# Patient Record
Sex: MALE | Race: BLACK OR AFRICAN AMERICAN | NOT HISPANIC OR LATINO | URBAN - METROPOLITAN AREA
[De-identification: names, ages, dates, MRNs, and addresses within clinical notes are randomized per-mention and may not be internally consistent; named-entity substitution may affect disease eponyms.]

---

## 2019-04-20 ENCOUNTER — INPATIENT (INPATIENT)
Facility: HOSPITAL | Age: 59
LOS: 2 days | Discharge: HOME | DRG: 872 | End: 2019-04-23
Attending: INTERNAL MEDICINE | Admitting: INTERNAL MEDICINE
Payer: COMMERCIAL

## 2019-04-20 VITALS
DIASTOLIC BLOOD PRESSURE: 81 MMHG | HEART RATE: 105 BPM | OXYGEN SATURATION: 100 % | RESPIRATION RATE: 16 BRPM | TEMPERATURE: 100 F | HEIGHT: 70 IN | SYSTOLIC BLOOD PRESSURE: 141 MMHG | WEIGHT: 207.01 LBS

## 2019-04-20 DIAGNOSIS — N45.1 EPIDIDYMITIS: ICD-10-CM

## 2019-04-20 LAB
ACETONE SERPL-MCNC: NEGATIVE — SIGNIFICANT CHANGE UP
ALBUMIN SERPL ELPH-MCNC: 3.4 G/DL — LOW (ref 3.5–5)
ALP SERPL-CCNC: 80 U/L — SIGNIFICANT CHANGE UP (ref 40–120)
ALT FLD-CCNC: 19 U/L DA — SIGNIFICANT CHANGE UP (ref 10–60)
ANION GAP SERPL CALC-SCNC: 8 MMOL/L — SIGNIFICANT CHANGE UP (ref 5–17)
APPEARANCE UR: CLEAR — SIGNIFICANT CHANGE UP
APTT BLD: 31.9 SEC — SIGNIFICANT CHANGE UP (ref 27.5–36.3)
AST SERPL-CCNC: 16 U/L — SIGNIFICANT CHANGE UP (ref 10–40)
BASOPHILS # BLD AUTO: 0.03 K/UL — SIGNIFICANT CHANGE UP (ref 0–0.2)
BASOPHILS NFR BLD AUTO: 0.2 % — SIGNIFICANT CHANGE UP (ref 0–2)
BILIRUB SERPL-MCNC: 0.4 MG/DL — SIGNIFICANT CHANGE UP (ref 0.2–1.2)
BILIRUB UR-MCNC: NEGATIVE — SIGNIFICANT CHANGE UP
BUN SERPL-MCNC: 17 MG/DL — SIGNIFICANT CHANGE UP (ref 7–18)
CALCIUM SERPL-MCNC: 8.5 MG/DL — SIGNIFICANT CHANGE UP (ref 8.4–10.5)
CHLORIDE SERPL-SCNC: 104 MMOL/L — SIGNIFICANT CHANGE UP (ref 96–108)
CO2 SERPL-SCNC: 24 MMOL/L — SIGNIFICANT CHANGE UP (ref 22–31)
COLOR SPEC: YELLOW — SIGNIFICANT CHANGE UP
CREAT SERPL-MCNC: 1.41 MG/DL — HIGH (ref 0.5–1.3)
DIFF PNL FLD: ABNORMAL
EOSINOPHIL # BLD AUTO: 0.02 K/UL — SIGNIFICANT CHANGE UP (ref 0–0.5)
EOSINOPHIL NFR BLD AUTO: 0.1 % — SIGNIFICANT CHANGE UP (ref 0–6)
EPI CELLS # UR: SIGNIFICANT CHANGE UP /HPF
GLUCOSE SERPL-MCNC: 108 MG/DL — HIGH (ref 70–99)
GLUCOSE UR QL: NEGATIVE — SIGNIFICANT CHANGE UP
HCT VFR BLD CALC: 35.7 % — LOW (ref 39–50)
HGB BLD-MCNC: 11.5 G/DL — LOW (ref 13–17)
IMM GRANULOCYTES NFR BLD AUTO: 0.4 % — SIGNIFICANT CHANGE UP (ref 0–1.5)
INR BLD: 1.22 RATIO — HIGH (ref 0.88–1.16)
KETONES UR-MCNC: NEGATIVE — SIGNIFICANT CHANGE UP
LEUKOCYTE ESTERASE UR-ACNC: ABNORMAL
LYMPHOCYTES # BLD AUTO: 1.99 K/UL — SIGNIFICANT CHANGE UP (ref 1–3.3)
LYMPHOCYTES # BLD AUTO: 10.4 % — LOW (ref 13–44)
MAGNESIUM SERPL-MCNC: 2.2 MG/DL — SIGNIFICANT CHANGE UP (ref 1.6–2.6)
MCHC RBC-ENTMCNC: 28.7 PG — SIGNIFICANT CHANGE UP (ref 27–34)
MCHC RBC-ENTMCNC: 32.2 GM/DL — SIGNIFICANT CHANGE UP (ref 32–36)
MCV RBC AUTO: 89 FL — SIGNIFICANT CHANGE UP (ref 80–100)
MONOCYTES # BLD AUTO: 1.28 K/UL — HIGH (ref 0–0.9)
MONOCYTES NFR BLD AUTO: 6.7 % — SIGNIFICANT CHANGE UP (ref 2–14)
NEUTROPHILS # BLD AUTO: 15.76 K/UL — HIGH (ref 1.8–7.4)
NEUTROPHILS NFR BLD AUTO: 82.2 % — HIGH (ref 43–77)
NITRITE UR-MCNC: NEGATIVE — SIGNIFICANT CHANGE UP
NRBC # BLD: 0 /100 WBCS — SIGNIFICANT CHANGE UP (ref 0–0)
PH UR: 5 — SIGNIFICANT CHANGE UP (ref 5–8)
PLATELET # BLD AUTO: 415 K/UL — HIGH (ref 150–400)
POTASSIUM SERPL-MCNC: 3.9 MMOL/L — SIGNIFICANT CHANGE UP (ref 3.5–5.3)
POTASSIUM SERPL-SCNC: 3.9 MMOL/L — SIGNIFICANT CHANGE UP (ref 3.5–5.3)
PROT SERPL-MCNC: 8.2 G/DL — SIGNIFICANT CHANGE UP (ref 6–8.3)
PROT UR-MCNC: 30 MG/DL
PROTHROM AB SERPL-ACNC: 13.6 SEC — HIGH (ref 10–12.9)
RBC # BLD: 4.01 M/UL — LOW (ref 4.2–5.8)
RBC # FLD: 13.4 % — SIGNIFICANT CHANGE UP (ref 10.3–14.5)
RBC CASTS # UR COMP ASSIST: ABNORMAL /HPF (ref 0–2)
SODIUM SERPL-SCNC: 136 MMOL/L — SIGNIFICANT CHANGE UP (ref 135–145)
SP GR SPEC: 1.01 — SIGNIFICANT CHANGE UP (ref 1.01–1.02)
UROBILINOGEN FLD QL: NEGATIVE — SIGNIFICANT CHANGE UP
WBC # BLD: 19.15 K/UL — HIGH (ref 3.8–10.5)
WBC # FLD AUTO: 19.15 K/UL — HIGH (ref 3.8–10.5)
WBC UR QL: ABNORMAL /HPF (ref 0–5)

## 2019-04-20 PROCEDURE — 71045 X-RAY EXAM CHEST 1 VIEW: CPT | Mod: 26

## 2019-04-20 PROCEDURE — 99285 EMERGENCY DEPT VISIT HI MDM: CPT

## 2019-04-20 PROCEDURE — 76870 US EXAM SCROTUM: CPT | Mod: 26

## 2019-04-20 RX ORDER — PIPERACILLIN AND TAZOBACTAM 4; .5 G/20ML; G/20ML
3.38 INJECTION, POWDER, LYOPHILIZED, FOR SOLUTION INTRAVENOUS EVERY 8 HOURS
Qty: 0 | Refills: 0 | Status: DISCONTINUED | OUTPATIENT
Start: 2019-04-20 | End: 2019-04-21

## 2019-04-20 RX ORDER — HEPARIN SODIUM 5000 [USP'U]/ML
5000 INJECTION INTRAVENOUS; SUBCUTANEOUS EVERY 8 HOURS
Qty: 0 | Refills: 0 | Status: DISCONTINUED | OUTPATIENT
Start: 2019-04-20 | End: 2019-04-23

## 2019-04-20 RX ORDER — SODIUM CHLORIDE 9 MG/ML
1000 INJECTION INTRAMUSCULAR; INTRAVENOUS; SUBCUTANEOUS ONCE
Qty: 0 | Refills: 0 | Status: COMPLETED | OUTPATIENT
Start: 2019-04-20 | End: 2019-04-20

## 2019-04-20 RX ORDER — CEFTRIAXONE 500 MG/1
1 INJECTION, POWDER, FOR SOLUTION INTRAMUSCULAR; INTRAVENOUS ONCE
Qty: 0 | Refills: 0 | Status: COMPLETED | OUTPATIENT
Start: 2019-04-20 | End: 2019-04-20

## 2019-04-20 RX ORDER — ACETAMINOPHEN 500 MG
650 TABLET ORAL ONCE
Qty: 0 | Refills: 0 | Status: COMPLETED | OUTPATIENT
Start: 2019-04-20 | End: 2019-04-20

## 2019-04-20 RX ORDER — SODIUM CHLORIDE 9 MG/ML
2900 INJECTION INTRAMUSCULAR; INTRAVENOUS; SUBCUTANEOUS ONCE
Qty: 0 | Refills: 0 | Status: COMPLETED | OUTPATIENT
Start: 2019-04-20 | End: 2019-04-20

## 2019-04-20 RX ORDER — DIPHENHYDRAMINE HCL 50 MG
25 CAPSULE ORAL ONCE
Qty: 0 | Refills: 0 | Status: COMPLETED | OUTPATIENT
Start: 2019-04-20 | End: 2019-04-20

## 2019-04-20 RX ORDER — KETOROLAC TROMETHAMINE 30 MG/ML
30 SYRINGE (ML) INJECTION ONCE
Qty: 0 | Refills: 0 | Status: DISCONTINUED | OUTPATIENT
Start: 2019-04-20 | End: 2019-04-20

## 2019-04-20 RX ADMIN — Medication 650 MILLIGRAM(S): at 15:10

## 2019-04-20 RX ADMIN — SODIUM CHLORIDE 1500 MILLILITER(S): 9 INJECTION INTRAMUSCULAR; INTRAVENOUS; SUBCUTANEOUS at 22:11

## 2019-04-20 RX ADMIN — Medication 30 MILLIGRAM(S): at 15:11

## 2019-04-20 RX ADMIN — CEFTRIAXONE 100 GRAM(S): 500 INJECTION, POWDER, FOR SOLUTION INTRAMUSCULAR; INTRAVENOUS at 14:38

## 2019-04-20 RX ADMIN — Medication 650 MILLIGRAM(S): at 19:29

## 2019-04-20 RX ADMIN — PIPERACILLIN AND TAZOBACTAM 25 GRAM(S): 4; .5 INJECTION, POWDER, LYOPHILIZED, FOR SOLUTION INTRAVENOUS at 23:21

## 2019-04-20 RX ADMIN — Medication 25 MILLIGRAM(S): at 23:30

## 2019-04-20 RX ADMIN — SODIUM CHLORIDE 2900 MILLILITER(S): 9 INJECTION INTRAMUSCULAR; INTRAVENOUS; SUBCUTANEOUS at 14:00

## 2019-04-20 RX ADMIN — Medication 650 MILLIGRAM(S): at 14:40

## 2019-04-20 RX ADMIN — SODIUM CHLORIDE 2900 MILLILITER(S): 9 INJECTION INTRAMUSCULAR; INTRAVENOUS; SUBCUTANEOUS at 15:30

## 2019-04-20 RX ADMIN — Medication 30 MILLIGRAM(S): at 14:41

## 2019-04-20 RX ADMIN — CEFTRIAXONE 1 GRAM(S): 500 INJECTION, POWDER, FOR SOLUTION INTRAMUSCULAR; INTRAVENOUS at 15:08

## 2019-04-20 RX ADMIN — Medication 650 MILLIGRAM(S): at 18:21

## 2019-04-20 NOTE — H&P ADULT - PROBLEM SELECTOR PLAN 1
Patient p/w L testicular pain, erythema and tenderness x Friday in addition to hematuria and dysuria.  US testicles: Left epididymitis and small varicocele.  Urology consult: Dr Roland  c/w Kelly  Follow up UCx, BCx Patient p/w L testicular pain, erythema and tenderness x Friday in addition to hematuria and dysuria.  US testicles: Left epididymitis and small varicocele.  Urology consult: Dr Roland, ID: Dr Payne  c/w Zosyn  Follow up UCx, BCx Patient p/w left testicular pain, erythema and tenderness x Friday in addition to hematuria and dysuria.  US testicles: Left epididymitis and small varicocele.  Urology consult: Dr Roland, ID: Dr Payne  c/w IV Zosyn  Follow up UCx, BCx

## 2019-04-20 NOTE — ED PROVIDER NOTE - OBJECTIVE STATEMENT
59 y.o. male c/o Lt testicular pain, swelling since Fri., fever past 3 days, chills, dysuria, hematuria, night sweat Wed. & Thurs., no n/v, recent travelling, pt took nothing, no h/o STD 59 y.o. male c/o Lt testicular pain, swelling since Fri., fever past 3 days, chills, rigors, dysuria, hematuria, night sweat Wed. & Thurs., no n/v, recent travelling, pt took nothing, no h/o STD

## 2019-04-20 NOTE — ED PROVIDER NOTE - CARE PLAN
Principal Discharge DX:	Epididymitis with no abscess  Secondary Diagnosis:	Cystitis  Secondary Diagnosis:	Renal insufficiency  Secondary Diagnosis:	Anemia

## 2019-04-20 NOTE — H&P ADULT - ASSESSMENT
59M, w/ PMHx HTN p/w L testicular pain and swelling x Friday, in addition to fever/chills, dysuria and hematuria.     ED: vitals stable, afebrile. WBC 19.15 w/ left shift, UA++, Testicular US: Left epididymitis and small varicocele.- Admitted for further management 59M, w/ PMHx HTN p/w left testicular pain and swelling x Friday, in addition to fever/chills, dysuria and hematuria.     ED: vitals stable, afebrile. WBC 19.15 w/ left shift, UA++, Testicular US: Left epididymitis and small varicocele.- Admitted for further management

## 2019-04-20 NOTE — H&P ADULT - NSHPSOCIALHISTORY_GEN_ALL_CORE
no tobacco smoking, no alcohol consumption no tobacco smoking, no alcohol consumption, no recreational drug use

## 2019-04-20 NOTE — H&P ADULT - PROBLEM SELECTOR PLAN 2
Likely pre-renal  s/p 3.9L NS , follow up BMP in AM after fluid challenge  Avoid nephrotoxic drugs  Follow up urine lytes

## 2019-04-20 NOTE — H&P ADULT - ATTENDING COMMENTS
Patient seen and examined in ED. Patient's history, vitals, labs, imaging studies reviewed.  Plan of care discussed with patient, and agrees, all questions answered.   Meagan Spivey MD Patient seen and examined in ED. Patient's history, vitals, labs, imaging studies reviewed.  Plan of care discussed with patient, and wife at bedside, agrees, all questions answered.   Meagan Spivey MD  4/20/2019 Patient seen and examined in ED. Patient's history, vitals, labs, imaging studies reviewed.  Discussed with above resident, agree with note with edits, and she was educated on the diagnosis and management of above medical conditions. In addition, severe sepsis present on admission - continue IV fluids, IV antibiotics, monitor wbc, temp, vitals. Plan of care discussed with patient, and wife at bedside, agrees, all questions answered.   Meagan Spivey MD  4/20/2019

## 2019-04-20 NOTE — ED ADULT NURSE NOTE - OBJECTIVE STATEMENT
presented tot he ER c/o pain to Left testicle  1 week , began to have fevers , chills , hematuria , painful urination  x 3 days , increased pain and swelling to Left testicle

## 2019-04-20 NOTE — H&P ADULT - HISTORY OF PRESENT ILLNESS
59M, w/ PMHx HTN p/w L testicular pain and swelling x Friday, in addition to fever/chills, dysuria and hematuria. Denies N/V, abdominal pain, diarrhea, urethral discharge or previous similar symptoms. Patient sexually active w/ wife, denies Hx STD's. 59M, w/ PMHx HTN p/w L testicular pain and swelling x Friday, in addition to fever/chills, dysuria and hematuria. Denies N/V, abdominal pain, diarrhea, flank pain, urethral discharge or previous similar symptoms. Patient sexually active w/ wife, denies Hx STD's. 59M, w/ PMHx HTN p/w left testicular pain and swelling x Friday, in addition to fever/chills, dysuria and hematuria. Denies N/V, abdominal pain, diarrhea, flank pain, urethral discharge or previous similar symptoms. Patient sexually active w/ wife, denies Hx STD's.

## 2019-04-21 DIAGNOSIS — A41.9 SEPSIS, UNSPECIFIED ORGANISM: ICD-10-CM

## 2019-04-21 DIAGNOSIS — N17.9 ACUTE KIDNEY FAILURE, UNSPECIFIED: ICD-10-CM

## 2019-04-21 DIAGNOSIS — Z29.9 ENCOUNTER FOR PROPHYLACTIC MEASURES, UNSPECIFIED: ICD-10-CM

## 2019-04-21 DIAGNOSIS — N45.1 EPIDIDYMITIS: ICD-10-CM

## 2019-04-21 DIAGNOSIS — I10 ESSENTIAL (PRIMARY) HYPERTENSION: ICD-10-CM

## 2019-04-21 DIAGNOSIS — E53.8 DEFICIENCY OF OTHER SPECIFIED B GROUP VITAMINS: ICD-10-CM

## 2019-04-21 LAB
ANION GAP SERPL CALC-SCNC: 6 MMOL/L — SIGNIFICANT CHANGE UP (ref 5–17)
BASOPHILS # BLD AUTO: 0.04 K/UL — SIGNIFICANT CHANGE UP (ref 0–0.2)
BASOPHILS NFR BLD AUTO: 0.2 % — SIGNIFICANT CHANGE UP (ref 0–2)
BUN SERPL-MCNC: 16 MG/DL — SIGNIFICANT CHANGE UP (ref 7–18)
CALCIUM SERPL-MCNC: 7.7 MG/DL — LOW (ref 8.4–10.5)
CHLORIDE SERPL-SCNC: 107 MMOL/L — SIGNIFICANT CHANGE UP (ref 96–108)
CHOLEST SERPL-MCNC: 130 MG/DL — SIGNIFICANT CHANGE UP (ref 10–199)
CO2 SERPL-SCNC: 24 MMOL/L — SIGNIFICANT CHANGE UP (ref 22–31)
CREAT SERPL-MCNC: 1.38 MG/DL — HIGH (ref 0.5–1.3)
CULTURE RESULTS: NO GROWTH — SIGNIFICANT CHANGE UP
EOSINOPHIL # BLD AUTO: 0.03 K/UL — SIGNIFICANT CHANGE UP (ref 0–0.5)
EOSINOPHIL NFR BLD AUTO: 0.1 % — SIGNIFICANT CHANGE UP (ref 0–6)
FERRITIN SERPL-MCNC: 143 NG/ML — SIGNIFICANT CHANGE UP (ref 30–400)
GLUCOSE SERPL-MCNC: 100 MG/DL — HIGH (ref 70–99)
HBA1C BLD-MCNC: 6.2 % — HIGH (ref 4–5.6)
HCT VFR BLD CALC: 32.3 % — LOW (ref 39–50)
HCV AB S/CO SERPL IA: 0.06 S/CO — SIGNIFICANT CHANGE UP (ref 0–0.99)
HCV AB SERPL-IMP: SIGNIFICANT CHANGE UP
HDLC SERPL-MCNC: 47 MG/DL — SIGNIFICANT CHANGE UP
HGB BLD-MCNC: 10.4 G/DL — LOW (ref 13–17)
IMM GRANULOCYTES NFR BLD AUTO: 1.2 % — SIGNIFICANT CHANGE UP (ref 0–1.5)
IRON SATN MFR SERPL: 10 UG/DL — LOW (ref 65–170)
IRON SATN MFR SERPL: 6 % — LOW (ref 20–55)
LIPID PNL WITH DIRECT LDL SERPL: 65 MG/DL — SIGNIFICANT CHANGE UP
LYMPHOCYTES # BLD AUTO: 13.8 % — SIGNIFICANT CHANGE UP (ref 13–44)
LYMPHOCYTES # BLD AUTO: 3.34 K/UL — HIGH (ref 1–3.3)
MAGNESIUM SERPL-MCNC: 2 MG/DL — SIGNIFICANT CHANGE UP (ref 1.6–2.6)
MCHC RBC-ENTMCNC: 28.8 PG — SIGNIFICANT CHANGE UP (ref 27–34)
MCHC RBC-ENTMCNC: 32.2 GM/DL — SIGNIFICANT CHANGE UP (ref 32–36)
MCV RBC AUTO: 89.5 FL — SIGNIFICANT CHANGE UP (ref 80–100)
MONOCYTES # BLD AUTO: 2.21 K/UL — HIGH (ref 0–0.9)
MONOCYTES NFR BLD AUTO: 9.1 % — SIGNIFICANT CHANGE UP (ref 2–14)
NEUTROPHILS # BLD AUTO: 18.31 K/UL — HIGH (ref 1.8–7.4)
NEUTROPHILS NFR BLD AUTO: 75.6 % — SIGNIFICANT CHANGE UP (ref 43–77)
NRBC # BLD: 0 /100 WBCS — SIGNIFICANT CHANGE UP (ref 0–0)
OSMOLALITY UR: 531 MOS/KG — SIGNIFICANT CHANGE UP (ref 50–1200)
PHOSPHATE SERPL-MCNC: 2.1 MG/DL — LOW (ref 2.5–4.5)
PLATELET # BLD AUTO: 392 K/UL — SIGNIFICANT CHANGE UP (ref 150–400)
POTASSIUM SERPL-MCNC: 3.7 MMOL/L — SIGNIFICANT CHANGE UP (ref 3.5–5.3)
POTASSIUM SERPL-SCNC: 3.7 MMOL/L — SIGNIFICANT CHANGE UP (ref 3.5–5.3)
POTASSIUM UR-SCNC: 27 MMOL/L — SIGNIFICANT CHANGE UP (ref 25–125)
RBC # BLD: 3.61 M/UL — LOW (ref 4.2–5.8)
RBC # FLD: 13.3 % — SIGNIFICANT CHANGE UP (ref 10.3–14.5)
SODIUM SERPL-SCNC: 137 MMOL/L — SIGNIFICANT CHANGE UP (ref 135–145)
SODIUM UR-SCNC: 105 MMOL/L — SIGNIFICANT CHANGE UP (ref 40–220)
SPECIMEN SOURCE: SIGNIFICANT CHANGE UP
TIBC SERPL-MCNC: 176 UG/DL — LOW (ref 250–450)
TOTAL CHOLESTEROL/HDL RATIO MEASUREMENT: 2.8 RATIO — LOW (ref 3.4–9.6)
TRIGL SERPL-MCNC: 92 MG/DL — SIGNIFICANT CHANGE UP (ref 10–149)
TSH SERPL-MCNC: 0.8 UU/ML — SIGNIFICANT CHANGE UP (ref 0.34–4.82)
UIBC SERPL-MCNC: 166 UG/DL — SIGNIFICANT CHANGE UP (ref 110–370)
VIT B12 SERPL-MCNC: 222 PG/ML — LOW (ref 232–1245)
WBC # BLD: 24.22 K/UL — HIGH (ref 3.8–10.5)
WBC # FLD AUTO: 24.22 K/UL — HIGH (ref 3.8–10.5)

## 2019-04-21 RX ORDER — ACETAMINOPHEN 500 MG
650 TABLET ORAL EVERY 6 HOURS
Qty: 0 | Refills: 0 | Status: DISCONTINUED | OUTPATIENT
Start: 2019-04-21 | End: 2019-04-23

## 2019-04-21 RX ORDER — LANOLIN ALCOHOL/MO/W.PET/CERES
5 CREAM (GRAM) TOPICAL AT BEDTIME
Qty: 0 | Refills: 0 | Status: DISCONTINUED | OUTPATIENT
Start: 2019-04-21 | End: 2019-04-23

## 2019-04-21 RX ORDER — SODIUM CHLORIDE 9 MG/ML
1000 INJECTION INTRAMUSCULAR; INTRAVENOUS; SUBCUTANEOUS
Qty: 0 | Refills: 0 | Status: DISCONTINUED | OUTPATIENT
Start: 2019-04-21 | End: 2019-04-21

## 2019-04-21 RX ORDER — PIPERACILLIN AND TAZOBACTAM 4; .5 G/20ML; G/20ML
3.38 INJECTION, POWDER, LYOPHILIZED, FOR SOLUTION INTRAVENOUS EVERY 8 HOURS
Qty: 0 | Refills: 0 | Status: DISCONTINUED | OUTPATIENT
Start: 2019-04-21 | End: 2019-04-23

## 2019-04-21 RX ORDER — IBUPROFEN 200 MG
400 TABLET ORAL EVERY 8 HOURS
Qty: 0 | Refills: 0 | Status: DISCONTINUED | OUTPATIENT
Start: 2019-04-21 | End: 2019-04-23

## 2019-04-21 RX ORDER — PREGABALIN 225 MG/1
1000 CAPSULE ORAL DAILY
Qty: 0 | Refills: 0 | Status: DISCONTINUED | OUTPATIENT
Start: 2019-04-21 | End: 2019-04-21

## 2019-04-21 RX ORDER — OLMESARTAN MEDOXOMIL / AMLODIPINE BESYLATE / HYDROCHLOROTHIAZIDE 40; 10; 25 MG/1; MG/1; MG/1
1 TABLET, FILM COATED ORAL
Qty: 0 | Refills: 0 | COMMUNITY

## 2019-04-21 RX ORDER — SODIUM CHLORIDE 9 MG/ML
1000 INJECTION INTRAMUSCULAR; INTRAVENOUS; SUBCUTANEOUS
Qty: 0 | Refills: 0 | Status: DISCONTINUED | OUTPATIENT
Start: 2019-04-21 | End: 2019-04-22

## 2019-04-21 RX ORDER — PREGABALIN 225 MG/1
1000 CAPSULE ORAL DAILY
Qty: 0 | Refills: 0 | Status: DISCONTINUED | OUTPATIENT
Start: 2019-04-21 | End: 2019-04-22

## 2019-04-21 RX ADMIN — PREGABALIN 1000 MICROGRAM(S): 225 CAPSULE ORAL at 22:46

## 2019-04-21 RX ADMIN — SODIUM CHLORIDE 100 MILLILITER(S): 9 INJECTION INTRAMUSCULAR; INTRAVENOUS; SUBCUTANEOUS at 21:53

## 2019-04-21 RX ADMIN — Medication 5 MILLIGRAM(S): at 22:46

## 2019-04-21 RX ADMIN — Medication 400 MILLIGRAM(S): at 19:00

## 2019-04-21 RX ADMIN — SODIUM CHLORIDE 75 MILLILITER(S): 9 INJECTION INTRAMUSCULAR; INTRAVENOUS; SUBCUTANEOUS at 07:41

## 2019-04-21 RX ADMIN — Medication 400 MILLIGRAM(S): at 18:16

## 2019-04-21 RX ADMIN — PIPERACILLIN AND TAZOBACTAM 25 GRAM(S): 4; .5 INJECTION, POWDER, LYOPHILIZED, FOR SOLUTION INTRAVENOUS at 14:38

## 2019-04-21 RX ADMIN — PIPERACILLIN AND TAZOBACTAM 25 GRAM(S): 4; .5 INJECTION, POWDER, LYOPHILIZED, FOR SOLUTION INTRAVENOUS at 21:46

## 2019-04-21 NOTE — PROGRESS NOTE ADULT - PROBLEM SELECTOR PLAN 2
present on admission, continue IV antibiotics, IV fluids present on admission, continue IV antibiotics, IV fluids  monitor vitals, labs  ID Dr. Payne

## 2019-04-21 NOTE — PROGRESS NOTE ADULT - PROBLEM SELECTOR PLAN 3
Likely pre-renal, continue IV fluids  follow up BMP in AM after fluid challenge  Avoid nephrotoxic drugs  Follow up urine lytes

## 2019-04-21 NOTE — CONSULT NOTE ADULT - ASSESSMENT
60 yo M with left epidydimitis    - Reviewed labwork and imaging  - Continue care per primary team and ID  - FU all cultures  - No urgent surgical intervention indicated at this time
Left sided Epididymitis   Leukocytosis  Fevers    Plan - cont Zosyn 3.375gms iv q8hrs  await culture results.  Ordered Scrotal support for patient.

## 2019-04-21 NOTE — PROGRESS NOTE ADULT - SUBJECTIVE AND OBJECTIVE BOX
Patient is a 59 year old  Male who presents with a chief complaint of epididymitis (2019 13:16)    Patient seen and examined, reports fever    MEDICATIONS  (STANDING):  cyanocobalamin Injectable 1000 MICROGram(s) IntraMuscular daily  heparin  Injectable 5000 Unit(s) SubCutaneous every 8 hours  piperacillin/tazobactam IVPB. 3.375 Gram(s) IV Intermittent every 8 hours  sodium chloride 0.9%. 1000 milliLiter(s) (100 mL/Hr) IV Continuous <Continuous>    MEDICATIONS  (PRN):  acetaminophen   Tablet .. 650 milliGRAM(s) Oral every 6 hours PRN Temp greater or equal to 38C (100.4F)  ibuprofen  Tablet. 400 milliGRAM(s) Oral every 8 hours PRN Temp greater or equal to 38.5C (101.3F), Moderate Pain (4 - 6)      REVIEW OF SYSTEMS:  CONSTITUTIONAL: fever, weight loss, has fatigue  EYES: No eye pain, visual disturbances, or discharge  ENMT:  No difficulty hearing, tinnitus, vertigo; No sinus or throat pain  NECK: No pain or stiffness  RESPIRATORY: No cough, wheezing, chills or hemoptysis; No shortness of breath  CARDIOVASCULAR: No chest pain, palpitations, dizziness, or leg swelling  GASTROINTESTINAL: No abdominal or epigastric pain. No nausea, vomiting, or hematemesis; No diarrhea or constipation. No melena or hematochezia.  GENITOURINARY: No dysuria, frequency, hematuria, or incontinence  NEUROLOGICAL: No headaches, memory loss, loss of strength, numbness, or tremors  SKIN: No itching, burning, rashes, or lesions   ENDOCRINE: No heat or cold intolerance; No hair loss  MUSCULOSKELETAL: No joint pain or swelling; No muscle, back, or extremity pain  PSYCHIATRIC: No depression, anxiety, mood swings, or difficulty sleeping  HEME/LYMPH: No easy bruising, or bleeding gums  ALLERGY AND IMMUNOLOGIC: No hives or eczema    PHYSICAL EXAM:  T(C): 38.3 (19 @ 18:10), Max: 38.3 (19 @ 18:10)  HR: 93 (19 @ 15:33) (82 - 93)  BP: 123/65 (19 @ 15:33) (98/58 - 129/70)  RR: 16 (19 @ 15:33) (16 - 18)  SpO2: 98% (19 @ 15:33) (96% - 100%)        GENERAL: NAD, well-groomed, well-developed  HEAD:  Atraumatic, Normocephalic  EYES: EOMI, PERRL, conjunctiva and sclera clear  ENMT:  Moist mucous membranes, Good dentition, No lesions  NECK: Supple, No JVD, Normal thyroid  NERVOUS SYSTEM:  Alert & Oriented X3, no focal deficit  CHEST/LUNG: Clear to ascultation bilaterally; No rales, rhonchi, wheezing, or rubs  HEART: Regular rate and rhythm; No murmurs, rubs, or gallops  ABDOMEN: Soft, Nontender, Nondistended; Bowel sounds present  EXTREMITIES:  2+ Peripheral Pulses, No clubbing, cyanosis, or edema  SKIN: left testicular edema, erythema        LABS:                        10.4   24.22 )-----------( 392      ( 2019 05:55 )             32.3         137  |  107  |  16  ----------------------------<  100<H>  3.7   |  24  |  1.38<H>    Ca    7.7<L>      2019 05:55  Phos  2.1       Mg     2.0         TPro  8.2  /  Alb  3.4<L>  /  TBili  0.4  /  DBili  x   /  AST  16  /  ALT  19  /  AlkPhos  80  -    PT/INR - ( 2019 14:16 )   PT: 13.6 sec;   INR: 1.22 ratio         PTT - ( 2019 14:16 )  PTT:31.9 sec  Urinalysis Basic - ( 2019 14:16 )    Color: Yellow / Appearance: Clear / S.015 / pH: x  Gluc: x / Ketone: Negative  / Bili: Negative / Urobili: Negative   Blood: x / Protein: 30 mg/dL / Nitrite: Negative   Leuk Esterase: Moderate / RBC: 25-50 /HPF / WBC 11-25 /HPF   Sq Epi: x / Non Sq Epi: Few /HPF / Bacteria: x      Culture - Urine (collected 19 @ 23:26)  Source: .Urine  Final Report (19 @ 17:33):    No growth        RADIOLOGY & ADDITIONAL TESTS:    Consultant(s) Notes Reviewed:  [x] YES  [ ] NO    Care Discussed with Consultants/Other Providers [x] YES  [ ] NO Patient is a 59 year old  Male who presents with a chief complaint of epididymitis (2019 13:16)    Patient seen and examined, reports fever, (Tm 101F) and chills,     MEDICATIONS  (STANDING):  cyanocobalamin Injectable 1000 MICROGram(s) IntraMuscular daily  heparin  Injectable 5000 Unit(s) SubCutaneous every 8 hours  piperacillin/tazobactam IVPB. 3.375 Gram(s) IV Intermittent every 8 hours  sodium chloride 0.9%. 1000 milliLiter(s) (100 mL/Hr) IV Continuous <Continuous>    MEDICATIONS  (PRN):  acetaminophen   Tablet .. 650 milliGRAM(s) Oral every 6 hours PRN Temp greater or equal to 38C (100.4F)  ibuprofen  Tablet. 400 milliGRAM(s) Oral every 8 hours PRN Temp greater or equal to 38.5C (101.3F), Moderate Pain (4 - 6)      REVIEW OF SYSTEMS:  CONSTITUTIONAL: has fever, chills, fatigue  EYES: No eye pain, visual disturbances, or discharge  ENMT:  No difficulty hearing, tinnitus, vertigo; No sinus or throat pain  NECK: No pain or stiffness  RESPIRATORY: No cough, wheezing, chills or hemoptysis; No shortness of breath  CARDIOVASCULAR: No chest pain, palpitations, dizziness, or leg swelling  GASTROINTESTINAL: No abdominal or epigastric pain. No nausea, vomiting, or hematemesis; No diarrhea or constipation. No melena or hematochezia.  GENITOURINARY: No dysuria, frequency, hematuria, or incontinence  NEUROLOGICAL: No headaches, memory loss, loss of strength, numbness, or tremors  SKIN: No itching, burning, rashes, or lesions   ENDOCRINE: No heat or cold intolerance; No hair loss  MUSCULOSKELETAL: No joint pain or swelling; No muscle, back, or extremity pain  PSYCHIATRIC: No depression, anxiety, mood swings, or difficulty sleeping  HEME/LYMPH: No easy bruising, or bleeding gums  ALLERGY AND IMMUNOLOGIC: No hives or eczema    PHYSICAL EXAM:  T(C): 38.3 (19 @ 18:10), Max: 38.3 (19 @ 18:10)  HR: 93 (19 @ 15:33) (82 - 93)  BP: 123/65 (19 @ 15:33) (98/58 - 129/70)  RR: 16 (19 @ 15:33) (16 - 18)  SpO2: 98% (19 @ 15:33) (96% - 100%)        GENERAL: NAD, well-groomed, well-developed  HEAD:  Atraumatic, Normocephalic  EYES: EOMI, PERRL, conjunctiva and sclera clear  ENMT:  Moist mucous membranes, Good dentition, No lesions  NECK: Supple, No JVD, Normal thyroid  NERVOUS SYSTEM:  Alert & Oriented X3, no focal deficit  CHEST/LUNG: Clear to ascultation bilaterally; No rales, rhonchi, wheezing, or rubs  HEART: Regular rate and rhythm; No murmurs, rubs, or gallops  ABDOMEN: Soft, Nontender, Nondistended; Bowel sounds present  EXTREMITIES:  2+ Peripheral Pulses, No clubbing, cyanosis, or edema  SKIN: left testicular edema, erythema        LABS:                        10.4   24.22 )-----------( 392      ( 2019 05:55 )             32.3         137  |  107  |  16  ----------------------------<  100<H>  3.7   |  24  |  1.38<H>    Ca    7.7<L>      2019 05:55  Phos  2.1       Mg     2.0         TPro  8.2  /  Alb  3.4<L>  /  TBili  0.4  /  DBili  x   /  AST  16  /  ALT  19  /  AlkPhos  80      PT/INR - ( 2019 14:16 )   PT: 13.6 sec;   INR: 1.22 ratio         PTT - ( 2019 14:16 )  PTT:31.9 sec  Urinalysis Basic - ( 2019 14:16 )    Color: Yellow / Appearance: Clear / S.015 / pH: x  Gluc: x / Ketone: Negative  / Bili: Negative / Urobili: Negative   Blood: x / Protein: 30 mg/dL / Nitrite: Negative   Leuk Esterase: Moderate / RBC: 25-50 /HPF / WBC 11-25 /HPF   Sq Epi: x / Non Sq Epi: Few /HPF / Bacteria: x      Culture - Urine (collected 19 @ 23:26)  Source: .Urine  Final Report (19 @ 17:33):    No growth        RADIOLOGY & ADDITIONAL TESTS:    Consultant(s) Notes Reviewed:  [x] YES  [ ] NO    Care Discussed with Consultants/Other Providers [x] YES  [ ] NO

## 2019-04-21 NOTE — CONSULT NOTE ADULT - GASTROINTESTINAL DETAILS
no guarding/no rigidity/bowel sounds normal/no organomegaly/no distention/nontender/soft/no masses palpable

## 2019-04-21 NOTE — CHART NOTE - NSCHARTNOTEFT_GEN_A_CORE
EVENT: Called to bedside pt refusing IM B12 and requesting med to sleep    OBJECTIVE:  Vital Signs Last 24 Hrs  T(C): 38.3 (2019 18:10), Max: 38.3 (2019 18:10)  T(F): 101 (2019 18:10), Max: 101 (2019 18:10)  HR: 93 (2019 15:33) (83 - 93)  BP: 123/65 (2019 15:33) (98/58 - 129/70)  BP(mean): --  RR: 16 (2019 15:33) (16 - 18)  SpO2: 98% (2019 15:33) (98% - 100%)    FOCUSED PHYSICAL EXAM:  Neuro: Alert, oriented X 3  Resp: even, unlabored, symmetry at chest wall  Cardiac: S1 S2 regular rate  skin: warm,  dry  Ext: AROM    LABS:                        10.4   24.22 )-----------( 392      ( 2019 05:55 )             32.3     04-21    137  |  107  |  16  ----------------------------<  100<H>  3.7   |  24  |  1.38<H>    Ca    7.7<L>      2019 05:55  Phos  2.1     -  Mg     2.0     -    TPro  8.2  /  Alb  3.4<L>  /  TBili  0.4  /  DBili  x   /  AST  16  /  ALT  19  /  AlkPhos  80  -      EK19  Ventricular Rate 86 BPM  Atrial Rate 86 BPM  P-R Interval 166 ms  QRS Duration 84 ms  Q-T Interval 350 ms  QTC Calculation(Bezet) 418 ms  P Axis 57 degrees  R Axis -8 degrees  T Axis 0 degrees  Diagnosis Line Normal sinus rhythm  Inferior infarct , age undetermined  Abnormal ECG    IMAGING: CXR 19  COMPARISON: None available.  Left hemidiaphragm is mildly elevated.  The heart does not suggest enlargement.  The lung fields and pleural surfaces are unremarkable.  IMPRESSION: Slightly elevated left hemidiaphragm. Otherwise unremarkable   study.    ASSESSMENT:  HPI:  59M, w/ PMHx HTN p/w left testicular pain and swelling x Friday, in addition to fever/chills, dysuria and hematuria. Denies N/V, abdominal pain, diarrhea, flank pain, urethral discharge or previous similar symptoms. Patient sexually active w/ wife, denies Hx STD's. (2019 19:52)    PLAN:   1. Switch to B12 1000mcg PO   2. Melatonin 5 mg po qhs    FOLLOW UP: B12 level

## 2019-04-21 NOTE — CONSULT NOTE ADULT - GENITOURINARY COMMENTS
left scrotal swelling and pain Left scrotal swelling, with warmth and tenderness of skin and tenderness and swelling of epididymus and some mild testicular enlargement and swelling but no tenderness of testicle, no penile lesions or abnormality

## 2019-04-21 NOTE — CHART NOTE - NSCHARTNOTEFT_GEN_A_CORE
Pt noted to have a fever of 101, no additional blood cultures sent as cultures sent yesterday. Started PRN Tylenol for fever. CXR performed yesterday unremarkable, UA and UCx negative. WBC this AM significant for WBC Count : 24.22 K/uL.    Vital Signs Last 24 Hrs  T(C): 38.3 (21 Apr 2019 18:10), Max: 38.3 (21 Apr 2019 18:10)  T(F): 101 (21 Apr 2019 18:10), Max: 101 (21 Apr 2019 18:10)  HR: 93 (21 Apr 2019 15:33) (82 - 93)  BP: 123/65 (21 Apr 2019 15:33) (98/58 - 129/70)  BP(mean): --  RR: 16 (21 Apr 2019 15:33) (16 - 18)  SpO2: 98% (21 Apr 2019 15:33) (96% - 100%)    CBC Full  -  ( 21 Apr 2019 05:55 )  WBC Count : 24.22 K/uL  RBC Count : 3.61 M/uL  Hemoglobin : 10.4 g/dL  Hematocrit : 32.3 %  Platelet Count - Automated : 392 K/uL  Mean Cell Volume : 89.5 fl  Mean Cell Hemoglobin : 28.8 pg  Mean Cell Hemoglobin Concentration : 32.2 gm/dL  Auto Neutrophil # : 18.31 K/uL  Auto Lymphocyte # : 3.34 K/uL  Auto Monocyte # : 2.21 K/uL  Auto Eosinophil # : 0.03 K/uL  Auto Basophil # : 0.04 K/uL  Auto Neutrophil % : 75.6 %  Auto Lymphocyte % : 13.8 %  Auto Monocyte % : 9.1 %  Auto Eosinophil % : 0.1 %  Auto Basophil % : 0.2 %

## 2019-04-21 NOTE — CONSULT NOTE ADULT - SUBJECTIVE AND OBJECTIVE BOX
59M, w/ PMHx HTN p/w left testicular pain and swelling x Friday, in addition to fever/chills, dysuria and hematuria. Denies N/V, abdominal pain, diarrhea, flank pain, urethral discharge or previous similar symptoms. Patient sexually active w/ wife, denies Hx STD's.    Currently continues to have some testicular pain but no more fever or chills. Feeling somewhat better.    PAST MEDICAL & SURGICAL HISTORY:  HTN (hypertension)  No significant past surgical history    FAMILY HISTORY:  No pertinent family history in first degree relatives    Allergies    No Known Allergies    MEDICATIONS  (STANDING):  heparin  Injectable 5000 Unit(s) SubCutaneous every 8 hours  piperacillin/tazobactam IVPB. 3.375 Gram(s) IV Intermittent every 8 hours  sodium chloride 0.9%. 1000 milliLiter(s) (75 mL/Hr) IV Continuous <Continuous>    Social: neg tob, etoh, illicit    REVIEW OF SYSTEMS: 12 pt ROS neg except for HPI    Vital Signs Last 24 Hrs  T(C): 36.8 (21 Apr 2019 08:30), Max: 37.9 (20 Apr 2019 13:02)  T(F): 98.3 (21 Apr 2019 08:30), Max: 100.3 (20 Apr 2019 13:02)  HR: 89 (21 Apr 2019 08:30) (82 - 105)  BP: 109/55 (21 Apr 2019 08:30) (98/58 - 141/81)  BP(mean): --  RR: 18 (21 Apr 2019 08:30) (16 - 18)  SpO2: 98% (21 Apr 2019 08:30) (96% - 100%)    Abd: soft, nt/nd  : normal right testicle, left testicle difficult to palpate due to edema and scrotal swelling  Ext: neg c/c/e                          10.4   24.22 )-----------( 392      ( 21 Apr 2019 05:55 )             32.3   04-21    137  |  107  |  16  ----------------------------<  100<H>  3.7   |  24  |  1.38<H>    Ca    7.7<L>      21 Apr 2019 05:55  Phos  2.1     04-21  Mg     2.0     04-21    TPro  8.2  /  Alb  3.4<L>  /  TBili  0.4  /  DBili  x   /  AST  16  /  ALT  19  /  AlkPhos  80  04-20    EXAM:  US SCROTUM AND CONTENTS                            PROCEDURE DATE:  04/20/2019          INTERPRETATION:  CLINICAL INFORMATION: Left testicular pain    COMPARISON: None available.    TECHNIQUE: Testicular ultrasound utilizing color and spectral Doppler.    FINDINGS:      RIGHT:    Right testis: 4.2 x 2.1 x 2.7 cm. Normal echogenicity and echotexture   with no masses or areas of architectural distortion. Normal blood flow   pattern.    Right epididymis: Within normal limits. There are a few tiny   subcentimeter simple epididymal cysts.    Right hydrocele: None.    Right varicocele: None.      LEFT:     Left testis: 4.2 x 2.6 x 2.7 cm. Normal echogenicity and echotexture with   no masses or areas of architectural distortion. Normal blood flow pattern.    Left epididymis: Heterogeneous enlarged and hyperemic epididymal tail   consistent with epididymitis.     Left hydrocele: None.    Left varicocele: Small mildly complex hydrocele.    IMPRESSION:     Left epididymitis. No torsion.

## 2019-04-21 NOTE — PROGRESS NOTE ADULT - PROBLEM SELECTOR PLAN 1
Patient p/w left testicular pain, erythema and tenderness since Friday in addition to hematuria and dysuria.  US testicles: Left epididymitis and small varicocele.  Urology consult: Dr Roland, ID: Dr Payne  c/w IV Zosyn  Follow up UCx, BCx

## 2019-04-21 NOTE — CONSULT NOTE ADULT - SUBJECTIVE AND OBJECTIVE BOX
HPI:  59M, w/ PMHx HTN p/w left testicular pain and swelling x Friday, in addition to fever/chills, dysuria and hematuria. Denies N/V, abdominal pain, diarrhea, flank pain, urethral discharge or previous similar symptoms. Patient sexually active w/ wife, denies Hx STD's.       PAST MEDICAL & SURGICAL HISTORY:  HTN (hypertension)  No significant past surgical history      No Known Allergies      Meds:  heparin  Injectable 5000 Unit(s) SubCutaneous every 8 hours  piperacillin/tazobactam IVPB. 3.375 Gram(s) IV Intermittent every 8 hours  sodium chloride 0.9%. 1000 milliLiter(s) IV Continuous <Continuous>      SOCIAL HISTORY:  Smoker:    ETOH use:    Ilicit Drug use:       FAMILY HISTORY:  No pertinent family history in first degree relatives      VITALS:  Vital Signs Last 24 Hrs  T(C): 36.8 (2019 08:30), Max: 37.2 (2019 20:12)  T(F): 98.3 (2019 08:30), Max: 99 (2019 20:12)  HR: 89 (2019 08:30) (82 - 89)  BP: 109/55 (2019 08:30) (98/58 - 129/70)  BP(mean): --  RR: 18 (2019 08:30) (16 - 18)  SpO2: 98% (2019 08:30) (96% - 100%)    LABS/DIAGNOSTIC TESTS:                          10.4   24.22 )-----------( 392      ( 2019 05:55 )             32.3     WBC Count: 24.22 K/uL ( @ 05:55)  WBC Count: 19.15 K/uL ( @ 14:16)          137  |  107  |  16  ----------------------------<  100<H>  3.7   |  24  |  1.38<H>    Ca    7.7<L>      2019 05:55  Phos  2.1       Mg     2.0         TPro  8.2  /  Alb  3.4<L>  /  TBili  0.4  /  DBili  x   /  AST  16  /  ALT  19  /  AlkPhos  80        Urinalysis Basic - ( 2019 14:16 )    Color: Yellow / Appearance: Clear / S.015 / pH: x  Gluc: x / Ketone: Negative  / Bili: Negative / Urobili: Negative   Blood: x / Protein: 30 mg/dL / Nitrite: Negative   Leuk Esterase: Moderate / RBC: 25-50 /HPF / WBC 11-25 /HPF   Sq Epi: x / Non Sq Epi: Few /HPF / Bacteria: x        LIVER FUNCTIONS - ( 2019 14:16 )  Alb: 3.4 g/dL / Pro: 8.2 g/dL / ALK PHOS: 80 U/L / ALT: 19 U/L DA / AST: 16 U/L / GGT: x             PT/INR - ( 2019 14:16 )   PT: 13.6 sec;   INR: 1.22 ratio         PTT - ( 2019 14:16 )  PTT:31.9 sec    LACTATE:    ABG -     CULTURES:       RADIOLOGY:< from: US Testicles (19 @ 16:22) >  EXAM:  US SCROTUM AND CONTENTS                            PROCEDURE DATE:  2019          INTERPRETATION:  CLINICAL INFORMATION: Left testicular pain    COMPARISON: None available.    TECHNIQUE: Testicular ultrasound utilizing color and spectral Doppler.    FINDINGS:      RIGHT:    Right testis: 4.2 x 2.1 x 2.7 cm. Normal echogenicity and echotexture   with no masses or areas of architectural distortion. Normal blood flow   pattern.    Right epididymis: Within normal limits. There are a few tiny   subcentimeter simple epididymal cysts.    Right hydrocele: None.    Right varicocele: None.      LEFT:     Left testis: 4.2 x 2.6 x 2.7 cm. Normal echogenicity and echotexture with   no masses or areas of architectural distortion. Normal blood flow pattern.    Left epididymis: Heterogeneous enlarged and hyperemic epididymal tail   consistent with epididymitis.     Left hydrocele: None.    Left varicocele: Small mildly complex hydrocele.    IMPRESSION:     Left epididymitis. No torsion.      ---------------------------------------------------------------------------------------------------------------------------------------------      EXAM:  XR CHEST AP OR PA 1V                            PROCEDURE DATE:  2019          INTERPRETATION:  PA chest on 2019 at 2:24 PM. Patient has   sepsis.    COMPARISON: None available.    Left hemidiaphragm is mildly elevated.    The heart does not suggest enlargement.    The lung fields and pleural surfaces are unremarkable.    IMPRESSION: Slightly elevated left hemidiaphragm. Otherwise unremarkable   study.      < end of copied text >      ROS  [  ] UNABLE TO ELICIT HPI:  59M, w/ PMHx HTN p/w left testicular pain and swelling x Friday, in addition to fever/chills, dysuria and hematuria. Denies N/V, abdominal pain, diarrhea, flank pain, urethral discharge or previous similar symptoms. Patient sexually active w/ wife, denies Hx STD's.     History as above. Pt was admitted  with above symptoms and was having fevers and chills at home, he has not had any fevers here since being on abxs but still has significant left testicular  pain and swelling. He denies having any urinary symptoms or difficulty urinating.      PAST MEDICAL & SURGICAL HISTORY:  HTN (hypertension)  No significant past surgical history      No Known Allergies      Meds:  heparin  Injectable 5000 Unit(s) SubCutaneous every 8 hours  piperacillin/tazobactam IVPB. 3.375 Gram(s) IV Intermittent every 8 hours  sodium chloride 0.9%. 1000 milliLiter(s) IV Continuous <Continuous>      SOCIAL HISTORY:  Smoker:    ETOH use:    Ilicit Drug use:       FAMILY HISTORY:  No pertinent family history in first degree relatives      VITALS:  Vital Signs Last 24 Hrs  T(C): 36.8 (2019 08:30), Max: 37.2 (2019 20:12)  T(F): 98.3 (2019 08:30), Max: 99 (2019 20:12)  HR: 89 (2019 08:30) (82 - 89)  BP: 109/55 (2019 08:30) (98/58 - 129/70)  BP(mean): --  RR: 18 (2019 08:30) (16 - 18)  SpO2: 98% (2019 08:30) (96% - 100%)    LABS/DIAGNOSTIC TESTS:                          10.4   24.22 )-----------( 392      ( 2019 05:55 )             32.3     WBC Count: 24.22 K/uL ( @ 05:55)  WBC Count: 19.15 K/uL ( @ 14:16)          137  |  107  |  16  ----------------------------<  100<H>  3.7   |  24  |  1.38<H>    Ca    7.7<L>      2019 05:55  Phos  2.1       Mg     2.0         TPro  8.2  /  Alb  3.4<L>  /  TBili  0.4  /  DBili  x   /  AST  16  /  ALT  19  /  AlkPhos  80        Urinalysis Basic - ( 2019 14:16 )    Color: Yellow / Appearance: Clear / S.015 / pH: x  Gluc: x / Ketone: Negative  / Bili: Negative / Urobili: Negative   Blood: x / Protein: 30 mg/dL / Nitrite: Negative   Leuk Esterase: Moderate / RBC: 25-50 /HPF / WBC 11-25 /HPF   Sq Epi: x / Non Sq Epi: Few /HPF / Bacteria: x        LIVER FUNCTIONS - ( 2019 14:16 )  Alb: 3.4 g/dL / Pro: 8.2 g/dL / ALK PHOS: 80 U/L / ALT: 19 U/L DA / AST: 16 U/L / GGT: x             PT/INR - ( 2019 14:16 )   PT: 13.6 sec;   INR: 1.22 ratio         PTT - ( 2019 14:16 )  PTT:31.9 sec    LACTATE:    ABG -     CULTURES:       RADIOLOGY:< from: US Testicles (19 @ 16:22) >  EXAM:  US SCROTUM AND CONTENTS                            PROCEDURE DATE:  2019          INTERPRETATION:  CLINICAL INFORMATION: Left testicular pain    COMPARISON: None available.    TECHNIQUE: Testicular ultrasound utilizing color and spectral Doppler.    FINDINGS:      RIGHT:    Right testis: 4.2 x 2.1 x 2.7 cm. Normal echogenicity and echotexture   with no masses or areas of architectural distortion. Normal blood flow   pattern.    Right epididymis: Within normal limits. There are a few tiny   subcentimeter simple epididymal cysts.    Right hydrocele: None.    Right varicocele: None.      LEFT:     Left testis: 4.2 x 2.6 x 2.7 cm. Normal echogenicity and echotexture with   no masses or areas of architectural distortion. Normal blood flow pattern.    Left epididymis: Heterogeneous enlarged and hyperemic epididymal tail   consistent with epididymitis.     Left hydrocele: None.    Left varicocele: Small mildly complex hydrocele.    IMPRESSION:     Left epididymitis. No torsion.      ---------------------------------------------------------------------------------------------------------------------------------------------      EXAM:  XR CHEST AP OR PA 1V                            PROCEDURE DATE:  2019          INTERPRETATION:  PA chest on 2019 at 2:24 PM. Patient has   sepsis.    COMPARISON: None available.    Left hemidiaphragm is mildly elevated.    The heart does not suggest enlargement.    The lung fields and pleural surfaces are unremarkable.    IMPRESSION: Slightly elevated left hemidiaphragm. Otherwise unremarkable   study.      < end of copied text >      ROS  [  ] UNABLE TO ELICIT HPI:  59M, w/ PMHx HTN p/w left testicular pain and swelling x Friday, in addition to fever/chills, dysuria and hematuria. Denies N/V, abdominal pain, diarrhea, flank pain, urethral discharge or previous similar symptoms. Patient sexually active w/ wife, denies Hx STD's.     History as above. Pt was admitted  with above symptoms and was having fevers and chills at home, he has not had any fevers here since being on abxs but still has significant left testicular  pain and swelling. He denies having any urinary symptoms or difficulty urinating. He was found to have left sided Epididymitis and Leukocytosis.      PAST MEDICAL & SURGICAL HISTORY:  HTN (hypertension)  No significant past surgical history      No Known Allergies      Meds:  heparin  Injectable 5000 Unit(s) SubCutaneous every 8 hours  piperacillin/tazobactam IVPB. 3.375 Gram(s) IV Intermittent every 8 hours  sodium chloride 0.9%. 1000 milliLiter(s) IV Continuous <Continuous>      SOCIAL HISTORY:  Smoker:  ex smoker  ETOH use: alcohol use in the past   Ilicit Drug use: no      FAMILY HISTORY:  No pertinent family history in first degree relatives      VITALS:  Vital Signs Last 24 Hrs  T(C): 36.8 (2019 08:30), Max: 37.2 (2019 20:12)  T(F): 98.3 (2019 08:30), Max: 99 (2019 20:12)  HR: 89 (2019 08:30) (82 - 89)  BP: 109/55 (2019 08:30) (98/58 - 129/70)  BP(mean): --  RR: 18 (2019 08:30) (16 - 18)  SpO2: 98% (2019 08:30) (96% - 100%)    LABS/DIAGNOSTIC TESTS:                          10.4   24.22 )-----------( 392      ( 2019 05:55 )             32.3     WBC Count: 24.22 K/uL ( @ 05:55)  WBC Count: 19.15 K/uL ( @ 14:16)          137  |  107  |  16  ----------------------------<  100<H>  3.7   |  24  |  1.38<H>    Ca    7.7<L>      2019 05:55  Phos  2.1     -  Mg     2.0     -    TPro  8.2  /  Alb  3.4<L>  /  TBili  0.4  /  DBili  x   /  AST  16  /  ALT  19  /  AlkPhos  80  -      Urinalysis Basic - ( 2019 14:16 )    Color: Yellow / Appearance: Clear / S.015 / pH: x  Gluc: x / Ketone: Negative  / Bili: Negative / Urobili: Negative   Blood: x / Protein: 30 mg/dL / Nitrite: Negative   Leuk Esterase: Moderate / RBC: 25-50 /HPF / WBC 11-25 /HPF   Sq Epi: x / Non Sq Epi: Few /HPF / Bacteria: x        LIVER FUNCTIONS - ( 2019 14:16 )  Alb: 3.4 g/dL / Pro: 8.2 g/dL / ALK PHOS: 80 U/L / ALT: 19 U/L DA / AST: 16 U/L / GGT: x             PT/INR - ( 2019 14:16 )   PT: 13.6 sec;   INR: 1.22 ratio         PTT - ( 2019 14:16 )  PTT:31.9 sec    LACTATE:    ABG -     CULTURES:       RADIOLOGY:< from: US Testicles (19 @ 16:22) >  EXAM:  US SCROTUM AND CONTENTS                            PROCEDURE DATE:  2019          INTERPRETATION:  CLINICAL INFORMATION: Left testicular pain    COMPARISON: None available.    TECHNIQUE: Testicular ultrasound utilizing color and spectral Doppler.    FINDINGS:      RIGHT:    Right testis: 4.2 x 2.1 x 2.7 cm. Normal echogenicity and echotexture   with no masses or areas of architectural distortion. Normal blood flow   pattern.    Right epididymis: Within normal limits. There are a few tiny   subcentimeter simple epididymal cysts.    Right hydrocele: None.    Right varicocele: None.      LEFT:     Left testis: 4.2 x 2.6 x 2.7 cm. Normal echogenicity and echotexture with   no masses or areas of architectural distortion. Normal blood flow pattern.    Left epididymis: Heterogeneous enlarged and hyperemic epididymal tail   consistent with epididymitis.     Left hydrocele: None.    Left varicocele: Small mildly complex hydrocele.    IMPRESSION:     Left epididymitis. No torsion.      ---------------------------------------------------------------------------------------------------------------------------------------------      EXAM:  XR CHEST AP OR PA 1V                            PROCEDURE DATE:  2019          INTERPRETATION:  PA chest on 2019 at 2:24 PM. Patient has   sepsis.    COMPARISON: None available.    Left hemidiaphragm is mildly elevated.    The heart does not suggest enlargement.    The lung fields and pleural surfaces are unremarkable.    IMPRESSION: Slightly elevated left hemidiaphragm. Otherwise unremarkable   study.      < end of copied text >      ROS  [  ] UNABLE TO ELICIT

## 2019-04-22 LAB
ALBUMIN SERPL ELPH-MCNC: 2.4 G/DL — LOW (ref 3.5–5)
ALP SERPL-CCNC: 72 U/L — SIGNIFICANT CHANGE UP (ref 40–120)
ALT FLD-CCNC: 23 U/L DA — SIGNIFICANT CHANGE UP (ref 10–60)
ANION GAP SERPL CALC-SCNC: 5 MMOL/L — SIGNIFICANT CHANGE UP (ref 5–17)
AST SERPL-CCNC: 23 U/L — SIGNIFICANT CHANGE UP (ref 10–40)
BASOPHILS # BLD AUTO: 0.03 K/UL — SIGNIFICANT CHANGE UP (ref 0–0.2)
BASOPHILS NFR BLD AUTO: 0.2 % — SIGNIFICANT CHANGE UP (ref 0–2)
BILIRUB SERPL-MCNC: 0.2 MG/DL — SIGNIFICANT CHANGE UP (ref 0.2–1.2)
BUN SERPL-MCNC: 11 MG/DL — SIGNIFICANT CHANGE UP (ref 7–18)
C TRACH RRNA SPEC QL NAA+PROBE: SIGNIFICANT CHANGE UP
CALCIUM SERPL-MCNC: 7.9 MG/DL — LOW (ref 8.4–10.5)
CHLORIDE SERPL-SCNC: 109 MMOL/L — HIGH (ref 96–108)
CO2 SERPL-SCNC: 25 MMOL/L — SIGNIFICANT CHANGE UP (ref 22–31)
CREAT SERPL-MCNC: 1.16 MG/DL — SIGNIFICANT CHANGE UP (ref 0.5–1.3)
EOSINOPHIL # BLD AUTO: 0.19 K/UL — SIGNIFICANT CHANGE UP (ref 0–0.5)
EOSINOPHIL NFR BLD AUTO: 1.1 % — SIGNIFICANT CHANGE UP (ref 0–6)
GLUCOSE SERPL-MCNC: 109 MG/DL — HIGH (ref 70–99)
HCT VFR BLD CALC: 29.5 % — LOW (ref 39–50)
HGB BLD-MCNC: 9.4 G/DL — LOW (ref 13–17)
IMM GRANULOCYTES NFR BLD AUTO: 0.9 % — SIGNIFICANT CHANGE UP (ref 0–1.5)
LYMPHOCYTES # BLD AUTO: 13.1 % — SIGNIFICANT CHANGE UP (ref 13–44)
LYMPHOCYTES # BLD AUTO: 2.35 K/UL — SIGNIFICANT CHANGE UP (ref 1–3.3)
MAGNESIUM SERPL-MCNC: 2.4 MG/DL — SIGNIFICANT CHANGE UP (ref 1.6–2.6)
MCHC RBC-ENTMCNC: 28.8 PG — SIGNIFICANT CHANGE UP (ref 27–34)
MCHC RBC-ENTMCNC: 31.9 GM/DL — LOW (ref 32–36)
MCV RBC AUTO: 90.5 FL — SIGNIFICANT CHANGE UP (ref 80–100)
MONOCYTES # BLD AUTO: 1.39 K/UL — HIGH (ref 0–0.9)
MONOCYTES NFR BLD AUTO: 7.7 % — SIGNIFICANT CHANGE UP (ref 2–14)
N GONORRHOEA RRNA SPEC QL NAA+PROBE: SIGNIFICANT CHANGE UP
NEUTROPHILS # BLD AUTO: 13.82 K/UL — HIGH (ref 1.8–7.4)
NEUTROPHILS NFR BLD AUTO: 77 % — SIGNIFICANT CHANGE UP (ref 43–77)
NRBC # BLD: 0 /100 WBCS — SIGNIFICANT CHANGE UP (ref 0–0)
PHOSPHATE SERPL-MCNC: 2.2 MG/DL — LOW (ref 2.5–4.5)
PLATELET # BLD AUTO: 404 K/UL — HIGH (ref 150–400)
POTASSIUM SERPL-MCNC: 3.9 MMOL/L — SIGNIFICANT CHANGE UP (ref 3.5–5.3)
POTASSIUM SERPL-SCNC: 3.9 MMOL/L — SIGNIFICANT CHANGE UP (ref 3.5–5.3)
PROT SERPL-MCNC: 6.7 G/DL — SIGNIFICANT CHANGE UP (ref 6–8.3)
RBC # BLD: 3.26 M/UL — LOW (ref 4.2–5.8)
RBC # FLD: 13.6 % — SIGNIFICANT CHANGE UP (ref 10.3–14.5)
SODIUM SERPL-SCNC: 139 MMOL/L — SIGNIFICANT CHANGE UP (ref 135–145)
SPECIMEN SOURCE: SIGNIFICANT CHANGE UP
WBC # BLD: 17.95 K/UL — HIGH (ref 3.8–10.5)
WBC # FLD AUTO: 17.95 K/UL — HIGH (ref 3.8–10.5)

## 2019-04-22 RX ORDER — PREGABALIN 225 MG/1
1000 CAPSULE ORAL DAILY
Qty: 0 | Refills: 0 | Status: DISCONTINUED | OUTPATIENT
Start: 2019-04-22 | End: 2019-04-23

## 2019-04-22 RX ORDER — SODIUM,POTASSIUM PHOSPHATES 278-250MG
1 POWDER IN PACKET (EA) ORAL
Qty: 0 | Refills: 0 | Status: DISCONTINUED | OUTPATIENT
Start: 2019-04-22 | End: 2019-04-23

## 2019-04-22 RX ADMIN — SODIUM CHLORIDE 100 MILLILITER(S): 9 INJECTION INTRAMUSCULAR; INTRAVENOUS; SUBCUTANEOUS at 06:36

## 2019-04-22 RX ADMIN — PIPERACILLIN AND TAZOBACTAM 25 GRAM(S): 4; .5 INJECTION, POWDER, LYOPHILIZED, FOR SOLUTION INTRAVENOUS at 14:38

## 2019-04-22 RX ADMIN — Medication 1 PACKET(S): at 21:48

## 2019-04-22 RX ADMIN — PIPERACILLIN AND TAZOBACTAM 25 GRAM(S): 4; .5 INJECTION, POWDER, LYOPHILIZED, FOR SOLUTION INTRAVENOUS at 21:50

## 2019-04-22 RX ADMIN — PREGABALIN 1000 MICROGRAM(S): 225 CAPSULE ORAL at 21:45

## 2019-04-22 RX ADMIN — PREGABALIN 1000 MICROGRAM(S): 225 CAPSULE ORAL at 12:10

## 2019-04-22 RX ADMIN — PIPERACILLIN AND TAZOBACTAM 25 GRAM(S): 4; .5 INJECTION, POWDER, LYOPHILIZED, FOR SOLUTION INTRAVENOUS at 06:36

## 2019-04-22 NOTE — PROGRESS NOTE ADULT - SUBJECTIVE AND OBJECTIVE BOX
PGY 1 Note discussed with supervising resident and primary attending    Patient is a 59y old  Male who presents with a chief complaint of epididymitis (22 Apr 2019 13:16)      INTERVAL HPI/OVERNIGHT EVENTS:   Patient is seen and examined at the bedside.   No new complains   Scrotal pain is better especially with scrotal support       MEDICATIONS  (STANDING):  cyanocobalamin 1000 MICROGram(s) Oral daily  heparin  Injectable 5000 Unit(s) SubCutaneous every 8 hours  melatonin 5 milliGRAM(s) Oral at bedtime  piperacillin/tazobactam IVPB. 3.375 Gram(s) IV Intermittent every 8 hours    MEDICATIONS  (PRN):  acetaminophen   Tablet .. 650 milliGRAM(s) Oral every 6 hours PRN Temp greater or equal to 38C (100.4F)  ibuprofen  Tablet. 400 milliGRAM(s) Oral every 8 hours PRN Temp greater or equal to 38.5C (101.3F), Moderate Pain (4 - 6)      __________________________________________________  REVIEW OF SYSTEMS:    CONSTITUTIONAL: No fever,   EYES: no acute visual disturbances  NECK: No pain or stiffness  RESPIRATORY: No cough; No shortness of breath  CARDIOVASCULAR: No chest pain, no palpitations  GASTROINTESTINAL: No pain. No nausea or vomiting; No diarrhea   NEUROLOGICAL: No headache or numbness, no tremors  MUSCULOSKELETAL: No joint pain, no muscle pain  GENITOURINARY: no dysuria, no frequency, no hesitancy, scrotal swelling   PSYCHIATRY: no depression , no anxiety  ALL OTHER  ROS negative        Vital Signs Last 24 Hrs  T(C): 36.4 (22 Apr 2019 08:11), Max: 38.3 (21 Apr 2019 18:10)  T(F): 97.6 (22 Apr 2019 08:11), Max: 101 (21 Apr 2019 18:10)  HR: 75 (22 Apr 2019 08:11) (72 - 93)  BP: 113/75 (22 Apr 2019 08:11) (97/59 - 127/81)  BP(mean): --  RR: 16 (22 Apr 2019 08:11) (16 - 16)  SpO2: 98% (22 Apr 2019 08:11) (98% - 100%)    ________________________________________________  PHYSICAL EXAM:  GENERAL: male in bed   HEENT: Normocephalic;  conjunctivae and sclerae clear; moist mucous membranes;   NECK : supple  CHEST/LUNG: Clear to auscultation bilaterally with good air entry   HEART: S1 S2  regular; no murmurs, gallops or rubs  ABDOMEN: Soft, Nontender, Nondistended; Bowel sounds present  EXTREMITIES: no cyanosis; no edema; no calf tenderness  scrotal swelling   SKIN: warm and dry; no rash  NERVOUS SYSTEM:  Awake and alert; Oriented  to place, person and time ; no new deficits    _________________________________________________  LABS:                        9.4    17.95 )-----------( 404      ( 22 Apr 2019 07:37 )             29.5     04-22    139  |  109<H>  |  11  ----------------------------<  109<H>  3.9   |  25  |  1.16    Ca    7.9<L>      22 Apr 2019 07:37  Phos  2.2     04-22  Mg     2.4     04-22    TPro  6.7  /  Alb  2.4<L>  /  TBili  0.2  /  DBili  x   /  AST  23  /  ALT  23  /  AlkPhos  72  04-22        CAPILLARY BLOOD GLUCOSE            RADIOLOGY & ADDITIONAL TESTS:    Imaging Personally Reviewed:  YEs    Consultant(s) Notes Reviewed:   YES    Care Discussed with Consultants :     Plan of care was discussed with patient and /or primary care giver; all questions and concerns were addressed and care was aligned with patient's wishes. PGY 1 Note discussed with supervising resident and primary attending    Patient is a 59 year old  Male who presents with a chief complaint of epididymitis (22 Apr 2019 13:16)      INTERVAL HPI/OVERNIGHT EVENTS:   Patient is seen and examined at the bedside.   No new complains   Scrotal pain is better especially with scrotal support       MEDICATIONS  (STANDING):  cyanocobalamin 1000 MICROGram(s) Oral daily  heparin  Injectable 5000 Unit(s) SubCutaneous every 8 hours  melatonin 5 milliGRAM(s) Oral at bedtime  piperacillin/tazobactam IVPB. 3.375 Gram(s) IV Intermittent every 8 hours    MEDICATIONS  (PRN):  acetaminophen   Tablet .. 650 milliGRAM(s) Oral every 6 hours PRN Temp greater or equal to 38C (100.4F)  ibuprofen  Tablet. 400 milliGRAM(s) Oral every 8 hours PRN Temp greater or equal to 38.5C (101.3F), Moderate Pain (4 - 6)      __________________________________________________  REVIEW OF SYSTEMS:    CONSTITUTIONAL: No fever,   EYES: no acute visual disturbances  NECK: No pain or stiffness  RESPIRATORY: No cough; No shortness of breath  CARDIOVASCULAR: No chest pain, no palpitations  GASTROINTESTINAL: No pain. No nausea or vomiting; No diarrhea   NEUROLOGICAL: No headache or numbness, no tremors  MUSCULOSKELETAL: No joint pain, no muscle pain  GENITOURINARY: no dysuria, no frequency, no hesitancy, scrotal swelling   PSYCHIATRY: no depression , no anxiety  ALL OTHER  ROS negative        Vital Signs Last 24 Hrs  T(C): 36.4 (22 Apr 2019 08:11), Max: 38.3 (21 Apr 2019 18:10)  T(F): 97.6 (22 Apr 2019 08:11), Max: 101 (21 Apr 2019 18:10)  HR: 75 (22 Apr 2019 08:11) (72 - 93)  BP: 113/75 (22 Apr 2019 08:11) (97/59 - 127/81)  RR: 16 (22 Apr 2019 08:11) (16 - 16)  SpO2: 98% (22 Apr 2019 08:11) (98% - 100%)    ________________________________________________  PHYSICAL EXAM:  GENERAL: male in bed   HEENT: Normocephalic;  conjunctivae and sclerae clear; moist mucous membranes;   NECK : supple  CHEST/LUNG: Clear to auscultation bilaterally with good air entry   HEART: S1 S2  regular; no murmurs, gallops or rubs  ABDOMEN: Soft, Nontender, Nondistended; Bowel sounds present  EXTREMITIES: no cyanosis; no edema; no calf tenderness  : scrotal swelling   SKIN: warm and dry; no rash  NERVOUS SYSTEM:  Awake and alert; Oriented  to place, person and time ; no new deficits    _________________________________________________  LABS:                        9.4    17.95 )-----------( 404      ( 22 Apr 2019 07:37 )             29.5     04-22    139  |  109<H>  |  11  ----------------------------<  109<H>  3.9   |  25  |  1.16    Ca    7.9<L>      22 Apr 2019 07:37  Phos  2.2     04-22  Mg     2.4     04-22    TPro  6.7  /  Alb  2.4<L>  /  TBili  0.2  /  DBili  x   /  AST  23  /  ALT  23  /  AlkPhos  72  04-22        RADIOLOGY & ADDITIONAL TESTS:    Consultant(s) Notes Reviewed:   YES    Care Discussed with Consultants : YES    Plan of care was discussed with patient and /or primary care giver; all questions and concerns were addressed and care was aligned with patient's wishes.

## 2019-04-22 NOTE — PROGRESS NOTE ADULT - SUBJECTIVE AND OBJECTIVE BOX
59y Male    Meds:  piperacillin/tazobactam IVPB. 3.375 Gram(s) IV Intermittent every 8 hours    Allergies    No Known Allergies    Intolerances        VITALS:  Vital Signs Last 24 Hrs  T(C): 36.4 (22 Apr 2019 08:11), Max: 38.3 (21 Apr 2019 18:10)  T(F): 97.6 (22 Apr 2019 08:11), Max: 101 (21 Apr 2019 18:10)  HR: 75 (22 Apr 2019 08:11) (72 - 93)  BP: 113/75 (22 Apr 2019 08:11) (97/59 - 127/81)  BP(mean): --  RR: 16 (22 Apr 2019 08:11) (16 - 16)  SpO2: 98% (22 Apr 2019 08:11) (98% - 100%)    LABS/DIAGNOSTIC TESTS:                          9.4    17.95 )-----------( 404      ( 22 Apr 2019 07:37 )             29.5         04-22    139  |  109<H>  |  11  ----------------------------<  109<H>  3.9   |  25  |  1.16    Ca    7.9<L>      22 Apr 2019 07:37  Phos  2.2     04-22  Mg     2.4     04-22    TPro  6.7  /  Alb  2.4<L>  /  TBili  0.2  /  DBili  x   /  AST  23  /  ALT  23  /  AlkPhos  72  04-22      LIVER FUNCTIONS - ( 22 Apr 2019 07:37 )  Alb: 2.4 g/dL / Pro: 6.7 g/dL / ALK PHOS: 72 U/L / ALT: 23 U/L DA / AST: 23 U/L / GGT: x             CULTURES: .Urine  04-20 @ 23:26   No growth  --  --      .Blood  04-20 @ 22:13   No growth to date.  --  --            RADIOLOGY:      ROS:  [  ] UNABLE TO ELICIT 59y Male who is doing better clinically , he has less left testicular swelling and pain and his fevers appear to have subsided also, his wbc count has also decreased. His urine and blood cultures are negative.    Meds:  piperacillin/tazobactam IVPB. 3.375 Gram(s) IV Intermittent every 8 hours    Allergies    No Known Allergies    Intolerances        VITALS:  Vital Signs Last 24 Hrs  T(C): 36.4 (22 Apr 2019 08:11), Max: 38.3 (21 Apr 2019 18:10)  T(F): 97.6 (22 Apr 2019 08:11), Max: 101 (21 Apr 2019 18:10)  HR: 75 (22 Apr 2019 08:11) (72 - 93)  BP: 113/75 (22 Apr 2019 08:11) (97/59 - 127/81)  BP(mean): --  RR: 16 (22 Apr 2019 08:11) (16 - 16)  SpO2: 98% (22 Apr 2019 08:11) (98% - 100%)    LABS/DIAGNOSTIC TESTS:                          9.4    17.95 )-----------( 404      ( 22 Apr 2019 07:37 )             29.5         04-22    139  |  109<H>  |  11  ----------------------------<  109<H>  3.9   |  25  |  1.16    Ca    7.9<L>      22 Apr 2019 07:37  Phos  2.2     04-22  Mg     2.4     04-22    TPro  6.7  /  Alb  2.4<L>  /  TBili  0.2  /  DBili  x   /  AST  23  /  ALT  23  /  AlkPhos  72  04-22      LIVER FUNCTIONS - ( 22 Apr 2019 07:37 )  Alb: 2.4 g/dL / Pro: 6.7 g/dL / ALK PHOS: 72 U/L / ALT: 23 U/L DA / AST: 23 U/L / GGT: x             CULTURES: .Urine  04-20 @ 23:26   No growth  --  --      .Blood  04-20 @ 22:13   No growth to date.  --  --            RADIOLOGY:      ROS:  [  ] UNABLE TO ELICIT

## 2019-04-22 NOTE — PROGRESS NOTE ADULT - SUBJECTIVE AND OBJECTIVE BOX
Patient seen and examined bedside resting comfortably.  No complaints offered.   Voiding spontaenously without difficulty.  Denies hematuria and dysuria.  States swelling and tenderness improving.     T(F): 97.6 (04-22-19 @ 08:11), Max: 101 (04-21-19 @ 18:10)  HR: 75 (04-22-19 @ 08:11) (72 - 93)  BP: 113/75 (04-22-19 @ 08:11) (97/59 - 127/81)  RR: 16 (04-22-19 @ 08:11) (16 - 16)  SpO2: 98% (04-22-19 @ 08:11) (98% - 100%)    PHYSICAL EXAM:  General: NAD, WDWN  Neuro:  Alert & oriented x 3  HEENT: NCAT, EOMI, conjunctiva clear  Lung: respirations nonlabored, good inspiratory effort  Abdomen: soft, NTND  : scrotal support in place, left testicle mild tenderness to palpation and edematous. right testicle normal     LABS:                        9.4    17.95 )-----------( 404      ( 22 Apr 2019 07:37 )             29.5     04-22    139  |  109<H>  |  11  ----------------------------<  109<H>  3.9   |  25  |  1.16    Ca    7.9<L>      22 Apr 2019 07:37  Phos  2.2     04-22  Mg     2.4     04-22    TPro  6.7  /  Alb  2.4<L>  /  TBili  0.2  /  DBili  x   /  AST  23  /  ALT  23  /  AlkPhos  72  04-22    PT/INR - ( 20 Apr 2019 14:16 )   PT: 13.6 sec;   INR: 1.22 ratio      PTT - ( 20 Apr 2019 14:16 )  PTT:31.9 sec  I&O's Detail    21 Apr 2019 07:01  -  22 Apr 2019 07:00  --------------------------------------------------------  IN:    sodium chloride 0.9%.: 1200 mL  Total IN: 1200 mL    OUT:    Voided: 600 mL  Total OUT: 600 mL    Total NET: 600 mL

## 2019-04-22 NOTE — PROGRESS NOTE ADULT - ATTENDING COMMENTS
Pt seen and examined. Agree with note as written above.    - trend WBC  - discharge planning
Patient seen and examined. Patient's history, vitals, labs, imaging studies reviewed. Discussed with above resident, agree with note with edits and educated on the diagnosis and management of above medical conditions. Sepsis - resolved; +Vit B12 deficiency - supplement Vit B12 IM, patient agreed; + Hypophosphatemia - supplement phosphate. Plan of care discussed with patient, and agrees, all questions answered.   Meagan Spivey MD
Patient seen and examined in ED. Patient's history, vitals, labs, imaging studies reviewed. Plan of care discussed with patient, and wife at bedside, agrees, all questions answered.   Meagan Spivey MD

## 2019-04-22 NOTE — PROGRESS NOTE ADULT - PROBLEM SELECTOR PLAN 1
Patient p/w left testicular pain, erythema and tenderness x Friday in addition to hematuria and dysuria.  US testicles: Left epididymitis and small varicocele.  Urology consult: Dr Roland, ID: Dr Payne  c/w IV Zosyn  Follow up UCx, BCx: NG   chlamydia / gonorrhea testing   WBC count trending down   scrotal support

## 2019-04-23 VITALS
OXYGEN SATURATION: 100 % | RESPIRATION RATE: 16 BRPM | SYSTOLIC BLOOD PRESSURE: 137 MMHG | DIASTOLIC BLOOD PRESSURE: 86 MMHG | TEMPERATURE: 98 F | HEART RATE: 76 BPM

## 2019-04-23 LAB
ANION GAP SERPL CALC-SCNC: 7 MMOL/L — SIGNIFICANT CHANGE UP (ref 5–17)
BASOPHILS # BLD AUTO: 0.02 K/UL — SIGNIFICANT CHANGE UP (ref 0–0.2)
BASOPHILS NFR BLD AUTO: 0.2 % — SIGNIFICANT CHANGE UP (ref 0–2)
BUN SERPL-MCNC: 9 MG/DL — SIGNIFICANT CHANGE UP (ref 7–18)
CALCIUM SERPL-MCNC: 7.9 MG/DL — LOW (ref 8.4–10.5)
CHLORIDE SERPL-SCNC: 111 MMOL/L — HIGH (ref 96–108)
CO2 SERPL-SCNC: 24 MMOL/L — SIGNIFICANT CHANGE UP (ref 22–31)
CREAT SERPL-MCNC: 1.16 MG/DL — SIGNIFICANT CHANGE UP (ref 0.5–1.3)
EOSINOPHIL # BLD AUTO: 0.25 K/UL — SIGNIFICANT CHANGE UP (ref 0–0.5)
EOSINOPHIL NFR BLD AUTO: 2.1 % — SIGNIFICANT CHANGE UP (ref 0–6)
GLUCOSE SERPL-MCNC: 96 MG/DL — SIGNIFICANT CHANGE UP (ref 70–99)
HCT VFR BLD CALC: 29.9 % — LOW (ref 39–50)
HGB BLD-MCNC: 9.4 G/DL — LOW (ref 13–17)
IMM GRANULOCYTES NFR BLD AUTO: 0.7 % — SIGNIFICANT CHANGE UP (ref 0–1.5)
LYMPHOCYTES # BLD AUTO: 26 % — SIGNIFICANT CHANGE UP (ref 13–44)
LYMPHOCYTES # BLD AUTO: 3.1 K/UL — SIGNIFICANT CHANGE UP (ref 1–3.3)
MCHC RBC-ENTMCNC: 28.3 PG — SIGNIFICANT CHANGE UP (ref 27–34)
MCHC RBC-ENTMCNC: 31.4 GM/DL — LOW (ref 32–36)
MCV RBC AUTO: 90.1 FL — SIGNIFICANT CHANGE UP (ref 80–100)
MONOCYTES # BLD AUTO: 0.85 K/UL — SIGNIFICANT CHANGE UP (ref 0–0.9)
MONOCYTES NFR BLD AUTO: 7.1 % — SIGNIFICANT CHANGE UP (ref 2–14)
NEUTROPHILS # BLD AUTO: 7.61 K/UL — HIGH (ref 1.8–7.4)
NEUTROPHILS NFR BLD AUTO: 63.9 % — SIGNIFICANT CHANGE UP (ref 43–77)
NRBC # BLD: 0 /100 WBCS — SIGNIFICANT CHANGE UP (ref 0–0)
PLATELET # BLD AUTO: 458 K/UL — HIGH (ref 150–400)
POTASSIUM SERPL-MCNC: 4.1 MMOL/L — SIGNIFICANT CHANGE UP (ref 3.5–5.3)
POTASSIUM SERPL-SCNC: 4.1 MMOL/L — SIGNIFICANT CHANGE UP (ref 3.5–5.3)
RBC # BLD: 3.32 M/UL — LOW (ref 4.2–5.8)
RBC # FLD: 13.7 % — SIGNIFICANT CHANGE UP (ref 10.3–14.5)
SODIUM SERPL-SCNC: 142 MMOL/L — SIGNIFICANT CHANGE UP (ref 135–145)
WBC # BLD: 11.91 K/UL — HIGH (ref 3.8–10.5)
WBC # FLD AUTO: 11.91 K/UL — HIGH (ref 3.8–10.5)

## 2019-04-23 PROCEDURE — 76870 US EXAM SCROTUM: CPT

## 2019-04-23 PROCEDURE — 84443 ASSAY THYROID STIM HORMONE: CPT

## 2019-04-23 PROCEDURE — 86900 BLOOD TYPING SEROLOGIC ABO: CPT

## 2019-04-23 PROCEDURE — 84133 ASSAY OF URINE POTASSIUM: CPT

## 2019-04-23 PROCEDURE — 82728 ASSAY OF FERRITIN: CPT

## 2019-04-23 PROCEDURE — 84100 ASSAY OF PHOSPHORUS: CPT

## 2019-04-23 PROCEDURE — 83540 ASSAY OF IRON: CPT

## 2019-04-23 PROCEDURE — 36415 COLL VENOUS BLD VENIPUNCTURE: CPT

## 2019-04-23 PROCEDURE — 80048 BASIC METABOLIC PNL TOTAL CA: CPT

## 2019-04-23 PROCEDURE — 82607 VITAMIN B-12: CPT

## 2019-04-23 PROCEDURE — 84300 ASSAY OF URINE SODIUM: CPT

## 2019-04-23 PROCEDURE — 99285 EMERGENCY DEPT VISIT HI MDM: CPT | Mod: 25

## 2019-04-23 PROCEDURE — 87491 CHLMYD TRACH DNA AMP PROBE: CPT

## 2019-04-23 PROCEDURE — 71045 X-RAY EXAM CHEST 1 VIEW: CPT

## 2019-04-23 PROCEDURE — 83735 ASSAY OF MAGNESIUM: CPT

## 2019-04-23 PROCEDURE — 85027 COMPLETE CBC AUTOMATED: CPT

## 2019-04-23 PROCEDURE — 87086 URINE CULTURE/COLONY COUNT: CPT

## 2019-04-23 PROCEDURE — 87040 BLOOD CULTURE FOR BACTERIA: CPT

## 2019-04-23 PROCEDURE — 80053 COMPREHEN METABOLIC PANEL: CPT

## 2019-04-23 PROCEDURE — 81001 URINALYSIS AUTO W/SCOPE: CPT

## 2019-04-23 PROCEDURE — 83935 ASSAY OF URINE OSMOLALITY: CPT

## 2019-04-23 PROCEDURE — 85730 THROMBOPLASTIN TIME PARTIAL: CPT

## 2019-04-23 PROCEDURE — 83036 HEMOGLOBIN GLYCOSYLATED A1C: CPT

## 2019-04-23 PROCEDURE — 96375 TX/PRO/DX INJ NEW DRUG ADDON: CPT | Mod: 76

## 2019-04-23 PROCEDURE — 83550 IRON BINDING TEST: CPT

## 2019-04-23 PROCEDURE — 80061 LIPID PANEL: CPT

## 2019-04-23 PROCEDURE — 82009 KETONE BODYS QUAL: CPT

## 2019-04-23 PROCEDURE — 86803 HEPATITIS C AB TEST: CPT

## 2019-04-23 PROCEDURE — 87591 N.GONORRHOEAE DNA AMP PROB: CPT

## 2019-04-23 PROCEDURE — 86850 RBC ANTIBODY SCREEN: CPT

## 2019-04-23 PROCEDURE — 86901 BLOOD TYPING SEROLOGIC RH(D): CPT

## 2019-04-23 PROCEDURE — 93005 ELECTROCARDIOGRAM TRACING: CPT

## 2019-04-23 PROCEDURE — 85610 PROTHROMBIN TIME: CPT

## 2019-04-23 PROCEDURE — 96374 THER/PROPH/DIAG INJ IV PUSH: CPT

## 2019-04-23 RX ORDER — HYDROCHLOROTHIAZIDE 25 MG
12.5 TABLET ORAL DAILY
Qty: 0 | Refills: 0 | Status: DISCONTINUED | OUTPATIENT
Start: 2019-04-23 | End: 2019-04-23

## 2019-04-23 RX ORDER — ACETAMINOPHEN 500 MG
2 TABLET ORAL
Qty: 0 | Refills: 0 | COMMUNITY
Start: 2019-04-23

## 2019-04-23 RX ORDER — PREGABALIN 225 MG/1
1 CAPSULE ORAL
Qty: 30 | Refills: 0 | OUTPATIENT
Start: 2019-04-23 | End: 2019-05-22

## 2019-04-23 RX ORDER — CEFUROXIME AXETIL 250 MG
1 TABLET ORAL
Qty: 28 | Refills: 0 | OUTPATIENT
Start: 2019-04-23 | End: 2019-05-06

## 2019-04-23 RX ORDER — AMLODIPINE BESYLATE 2.5 MG/1
5 TABLET ORAL DAILY
Qty: 0 | Refills: 0 | Status: DISCONTINUED | OUTPATIENT
Start: 2019-04-23 | End: 2019-04-23

## 2019-04-23 RX ORDER — IBUPROFEN 200 MG
1 TABLET ORAL
Qty: 30 | Refills: 0 | OUTPATIENT
Start: 2019-04-23 | End: 2019-05-02

## 2019-04-23 RX ADMIN — Medication 1 PACKET(S): at 00:00

## 2019-04-23 RX ADMIN — Medication 400 MILLIGRAM(S): at 00:00

## 2019-04-23 RX ADMIN — PREGABALIN 1000 MICROGRAM(S): 225 CAPSULE ORAL at 11:59

## 2019-04-23 RX ADMIN — Medication 1 PACKET(S): at 11:59

## 2019-04-23 RX ADMIN — PIPERACILLIN AND TAZOBACTAM 25 GRAM(S): 4; .5 INJECTION, POWDER, LYOPHILIZED, FOR SOLUTION INTRAVENOUS at 13:51

## 2019-04-23 RX ADMIN — AMLODIPINE BESYLATE 5 MILLIGRAM(S): 2.5 TABLET ORAL at 09:11

## 2019-04-23 RX ADMIN — Medication 5 MILLIGRAM(S): at 00:00

## 2019-04-23 RX ADMIN — Medication 400 MILLIGRAM(S): at 01:00

## 2019-04-23 RX ADMIN — Medication 1 PACKET(S): at 05:42

## 2019-04-23 RX ADMIN — PIPERACILLIN AND TAZOBACTAM 25 GRAM(S): 4; .5 INJECTION, POWDER, LYOPHILIZED, FOR SOLUTION INTRAVENOUS at 05:42

## 2019-04-23 RX ADMIN — Medication 12.5 MILLIGRAM(S): at 09:11

## 2019-04-23 NOTE — DISCHARGE NOTE PROVIDER - PROVIDER TOKENS
PROVIDER:[[id number]]],PROVIDER:[TOKEN:[[id number]]] PROVIDER:[TOKEN:[[id number]]],PROVIDER:[TOKEN:[[id number]]],PROVIDER:[TOKEN:[[id number]]] PROVIDER:[TOKEN:[[id number]]],PROVIDER:[TOKEN:[[id number]]]

## 2019-04-23 NOTE — DISCHARGE NOTE NURSING/CASE MANAGEMENT/SOCIAL WORK - NSDCDPATPORTLINK_GEN_ALL_CORE
You can access the [facility] Patient Portal, offered by [facility], by registering with the following website: [email or web address]

## 2019-04-23 NOTE — PROGRESS NOTE ADULT - RS GEN PE MLT RESP DETAILS PC
no rhonchi/no wheezes/clear to auscultation bilaterally/no rales/good air movement
good air movement/no wheezes/no rales/clear to auscultation bilaterally/no rhonchi

## 2019-04-23 NOTE — DISCHARGE NOTE PROVIDER - HOSPITAL COURSE
59M, w/ PMHx HTN p/w left testicular pain and swelling x Friday, in addition to fever/chills, dysuria and hematuria. Admitted with Epididymitis.         ED: vitals stable, afebrile. WBC 19.15 w/ left shift, UA++, Testicular US: Left epididymitis and small varicocele.        For Epididymitis, US testicles: Left epididymitis and small varicocele, Received IV Zosyn while inpatient, UCx, BCx: NG, Followed by Urology Dr Roland and ID: Dr Payne. Urine chlamydia / gonorrhea: negative, Recommended to take Ceftin 500 BID for 14 more days and PCP follow up.     scrotal support recommended. Patient also had BENEDICTO (acute kidney injury), most likely pre-renal.     Creatinine trended down to WNL with conservative management . For HTN, meds held on admission as BP was normal, resumed on discharge.         Patient is medically stable for discharge, Case is discussed with the attending

## 2019-04-23 NOTE — PROGRESS NOTE ADULT - GASTROINTESTINAL DETAILS
no guarding/bowel sounds normal/no organomegaly/no distention/no rigidity/soft/nontender
no guarding/nontender/soft/no organomegaly/no rigidity/no distention/bowel sounds normal

## 2019-04-23 NOTE — PROGRESS NOTE ADULT - ASSESSMENT
59 year old male with left epididymitis, leukocytosis improving    - continue antibiotics  - continue to monitor WBC  - continue scrotal support  - continue medical management
59M, w/ PMHx HTN p/w left testicular pain and swelling x Friday, in addition to fever/chills, dysuria and hematuria.     ED: vitals stable, afebrile. WBC 19.15 w/ left shift, UA++, Testicular US: Left epididymitis and small varicocele.- Admitted for further management
59M, w/ PMHx HTN p/w left testicular pain and swelling x Friday, in addition to fever/chills, dysuria and hematuria.  In ED: vitals stable, afebrile. WBC 19.15 w/ left shift, UA++, Testicular US: Left epididymitis and small varicocele.- Admitted for further management
Left sided Epididymitis - much better      plan - can dc home today on ceftin 500mgs po bid x 14 days
Left sided Epididymitis   Leukocytosis - improving  Fevers - improving    Plan - cont Zosyn 3.375gms iv q8hrs

## 2019-04-23 NOTE — DISCHARGE NOTE PROVIDER - CARE PROVIDER_API CALL
Kathy Payne)  Infectious Disease  [address] [place] [address]  Phone: [phone]  Fax: [phone]  Follow Up Time:     Meagan Spivey)  Geriatric Medicine; Internal Medicine  [address] [place] [address]  Phone: [phone]  Fax: [phone]  Follow Up Time: Kathy Payne)  Infectious Disease  [address] [address] [place] [address]  Phone: [phone]  Fax: [phone]  Follow Up Time:     Meagan Spivey)  Geriatric Medicine; Internal Medicine  [address] [place] [address]  Phone: [phone]  Fax: [phone]  Follow Up Time:     Myrna Roland; MPH)  Urology  [address] [facility] [address] [place] [address]  [place] [address]  Phone: [phone]  Fax: [phone]  Follow Up Time: Kathy Payne)  Infectious Disease  [address] [address] [place] [address]  Phone: [phone]  Fax: [phone]  Follow Up Time:     Myrna Roland; MPH)  Urology  [address] [facility] [address] [place] [address]  Phone: [phone]  Fax: [phone]  Follow Up Time:

## 2019-04-23 NOTE — PROGRESS NOTE ADULT - GENITOURINARY COMMENTS
decreased scrotal swelling, no warmth, no tenderness
left testicular swelling warmth and tenderness have decreased

## 2019-04-23 NOTE — PROGRESS NOTE ADULT - SUBJECTIVE AND OBJECTIVE BOX
59y Male    Meds:  piperacillin/tazobactam IVPB. 3.375 Gram(s) IV Intermittent every 8 hours    Allergies    No Known Allergies    Intolerances        VITALS:  Vital Signs Last 24 Hrs  T(C): 36.2 (23 Apr 2019 08:24), Max: 37.1 (22 Apr 2019 23:59)  T(F): 97.2 (23 Apr 2019 08:24), Max: 98.8 (22 Apr 2019 23:59)  HR: 62 (23 Apr 2019 08:24) (62 - 84)  BP: 114/74 (23 Apr 2019 08:24) (114/74 - 149/88)  BP(mean): --  RR: 17 (23 Apr 2019 08:24) (16 - 17)  SpO2: 100% (23 Apr 2019 08:24) (100% - 100%)    LABS/DIAGNOSTIC TESTS:                          9.4    11.91 )-----------( 458      ( 23 Apr 2019 08:28 )             29.9         04-23    142  |  111<H>  |  9   ----------------------------<  96  4.1   |  24  |  1.16    Ca    7.9<L>      23 Apr 2019 08:28  Phos  2.2     04-22  Mg     2.4     04-22    TPro  6.7  /  Alb  2.4<L>  /  TBili  0.2  /  DBili  x   /  AST  23  /  ALT  23  /  AlkPhos  72  04-22      LIVER FUNCTIONS - ( 22 Apr 2019 07:37 )  Alb: 2.4 g/dL / Pro: 6.7 g/dL / ALK PHOS: 72 U/L / ALT: 23 U/L DA / AST: 23 U/L / GGT: x             CULTURES: .Urine  04-20 @ 23:26   No growth  --  --      .Blood  04-20 @ 22:13   No growth to date.  --  --            RADIOLOGY:      ROS:  [  ] UNABLE TO ELICIT 59y Male who is doing dramatically better, he has almost no pain in the scrotum and the swelling is decreasing. He has no fevers or chills.    Meds:  piperacillin/tazobactam IVPB. 3.375 Gram(s) IV Intermittent every 8 hours    Allergies    No Known Allergies    Intolerances        VITALS:  Vital Signs Last 24 Hrs  T(C): 36.2 (23 Apr 2019 08:24), Max: 37.1 (22 Apr 2019 23:59)  T(F): 97.2 (23 Apr 2019 08:24), Max: 98.8 (22 Apr 2019 23:59)  HR: 62 (23 Apr 2019 08:24) (62 - 84)  BP: 114/74 (23 Apr 2019 08:24) (114/74 - 149/88)  BP(mean): --  RR: 17 (23 Apr 2019 08:24) (16 - 17)  SpO2: 100% (23 Apr 2019 08:24) (100% - 100%)    LABS/DIAGNOSTIC TESTS:                          9.4    11.91 )-----------( 458      ( 23 Apr 2019 08:28 )             29.9         04-23    142  |  111<H>  |  9   ----------------------------<  96  4.1   |  24  |  1.16    Ca    7.9<L>      23 Apr 2019 08:28  Phos  2.2     04-22  Mg     2.4     04-22    TPro  6.7  /  Alb  2.4<L>  /  TBili  0.2  /  DBili  x   /  AST  23  /  ALT  23  /  AlkPhos  72  04-22      LIVER FUNCTIONS - ( 22 Apr 2019 07:37 )  Alb: 2.4 g/dL / Pro: 6.7 g/dL / ALK PHOS: 72 U/L / ALT: 23 U/L DA / AST: 23 U/L / GGT: x             CULTURES: .Urine  04-20 @ 23:26   No growth  --  --      .Blood  04-20 @ 22:13   No growth to date.  --  --            RADIOLOGY:      ROS:  [  ] UNABLE TO ELICIT

## 2019-04-23 NOTE — DISCHARGE NOTE PROVIDER - NSDCCPCAREPLAN_GEN_ALL_CORE_FT
PRINCIPAL DISCHARGE DIAGNOSIS  Diagnosis: Epididymitis with no abscess  Assessment and Plan of Treatment: You presented with left testicular pain and swelling since Friday with fever/chills, dysuria and hematuria. You were  admitted with Epididymitis. Your US testicles showed Left epididymitis and small varicocele, You received IV Zosyn while inpatient. Your urine and blood culture didnot grew any bacteria. Your  urine chlamydia / gonorrhea test was  negative. You were followed by Dr Roland (urologist) and Dr Payne, ID doctor. You are recommended to take Ceftin 500 BID for 14 more days and PCP follow up. Also recommended to use scrotal support.      SECONDARY DISCHARGE DIAGNOSES  Diagnosis: BENEDICTO (acute kidney injury)  Assessment and Plan of Treatment: You presented with elevated Creatinine, most likey due to pre renal causes. It returned back to normal with conservative management, You are recommended to followup with PCP    Diagnosis: HTN (hypertension)  Assessment and Plan of Treatment: You are recommended to continue with home medication for HTN, exercise, take DASH diet and follow up with PCP    Diagnosis: Vitamin B12 deficiency  Assessment and Plan of Treatment: Your Vitamin B12 level is 222. You received B12 shots while inpatient. YOu are recommended to followup with PCP PRINCIPAL DISCHARGE DIAGNOSIS  Diagnosis: Epididymitis with no abscess  Assessment and Plan of Treatment: You presented with left testicular pain and swelling since Friday with fever/chills, dysuria and hematuria. You were  admitted with Epididymitis. Your US testicles showed Left epididymitis and small varicocele, You received IV Zosyn while inpatient. Your urine and blood culture didnot grew any bacteria. Your  urine chlamydia / gonorrhea test was  negative. You were followed by Dr Roland (urologist) and Dr Payne, ID doctor. You are recommended to take Ceftin 500 BID for 14 more days and PCP follow up. Also recommended to use scrotal support.      SECONDARY DISCHARGE DIAGNOSES  Diagnosis: BENEDICTO (acute kidney injury)  Assessment and Plan of Treatment: You presented with elevated Creatinine, most likey due to pre renal causes. It returned back to normal with conservative management, You are recommended to followup with PCP    Diagnosis: HTN (hypertension)  Assessment and Plan of Treatment: You are recommended to continue with home medication for HTN, exercise, take DASH diet and follow up with PCP    Diagnosis: Vitamin B12 deficiency  Assessment and Plan of Treatment: Your Vitamin B12 level is 222. You received B12 shots  while inpatient. YOu are recommended to take oral supplement of Vitamin B12 and  followup with PCP PRINCIPAL DISCHARGE DIAGNOSIS  Diagnosis: Epididymitis with no abscess  Assessment and Plan of Treatment: You presented with left testicular pain and swelling since Friday with fever/chills, dysuria and hematuria. You were  admitted with Epididymitis. Your US testicles showed Left epididymitis and small varicocele, You received IV Zosyn while inpatient. Your urine and blood culture didnot grew any bacteria. Your  urine chlamydia / gonorrhea test was  negative. You were followed by Dr Roland (urologist) and Dr Payne, ID doctor. You are recommended to take Ceftin 500 BID for 14 more days and PCP follow up. Also recommended to use scrotal support.      SECONDARY DISCHARGE DIAGNOSES  Diagnosis: Severe sepsis  Assessment and Plan of Treatment: resolved    Diagnosis: BENEDICTO (acute kidney injury)  Assessment and Plan of Treatment: You presented with elevated Creatinine, most likey due to pre renal causes. It returned back to normal with conservative management, You are recommended to followup with PCP    Diagnosis: HTN (hypertension)  Assessment and Plan of Treatment: You are recommended to continue with home medication for HTN, exercise, take DASH diet and follow up with PCP    Diagnosis: Vitamin B12 deficiency  Assessment and Plan of Treatment: Your Vitamin B12 level is 222. You received B12 shots  while inpatient. YOu are recommended to take oral supplement of Vitamin B12 and  followup with PCP

## 2019-04-25 LAB
CULTURE RESULTS: SIGNIFICANT CHANGE UP
CULTURE RESULTS: SIGNIFICANT CHANGE UP
SPECIMEN SOURCE: SIGNIFICANT CHANGE UP
SPECIMEN SOURCE: SIGNIFICANT CHANGE UP

## 2021-11-23 NOTE — ED ADULT TRIAGE NOTE - ESI TRIAGE ACUITY LEVEL, MLM
3